# Patient Record
Sex: MALE | Race: WHITE | NOT HISPANIC OR LATINO | Employment: FULL TIME | URBAN - METROPOLITAN AREA
[De-identification: names, ages, dates, MRNs, and addresses within clinical notes are randomized per-mention and may not be internally consistent; named-entity substitution may affect disease eponyms.]

---

## 2017-01-27 ENCOUNTER — APPOINTMENT (OUTPATIENT)
Dept: GENERAL RADIOLOGY | Facility: CLINIC | Age: 32
End: 2017-01-27
Attending: PHYSICIAN ASSISTANT
Payer: COMMERCIAL

## 2017-01-27 ENCOUNTER — HOSPITAL ENCOUNTER (EMERGENCY)
Facility: CLINIC | Age: 32
Discharge: HOME | End: 2017-01-27
Attending: PHYSICIAN ASSISTANT | Admitting: PHYSICIAN ASSISTANT
Payer: COMMERCIAL

## 2017-01-27 VITALS — DIASTOLIC BLOOD PRESSURE: 101 MMHG | TEMPERATURE: 98.8 F | SYSTOLIC BLOOD PRESSURE: 169 MMHG | OXYGEN SATURATION: 99 %

## 2017-01-27 DIAGNOSIS — M25.462 EFFUSION, LEFT KNEE: ICD-10-CM

## 2017-01-27 PROCEDURE — 73560 X-RAY EXAM OF KNEE 1 OR 2: CPT | Mod: LT

## 2017-01-27 PROCEDURE — 99213 OFFICE O/P EST LOW 20 MIN: CPT

## 2017-01-27 PROCEDURE — 99213 OFFICE O/P EST LOW 20 MIN: CPT | Performed by: PHYSICIAN ASSISTANT

## 2017-01-27 NOTE — ED AVS SNAPSHOT
[facility] Emergency Department    [address] [address]    Phone:  [phone]    Fax:  [phone]                                       Ed Mckenzie   MRN: [id number]    Department:  [facility] Emergency Department   Date of Visit:  1/27/2017           After Visit Summary Signature Page     I have received my discharge instructions, and my questions have been answered. I have discussed any challenges I see with this plan with the nurse or doctor.    ..........................................................................................................................................  Patient/Patient Representative Signature      ..........................................................................................................................................  Patient Representative Print Name and Relationship to Patient    ..................................................               ................................................  Date                                            Time    ..........................................................................................................................................  Reviewed by Signature/Title    ...................................................              ..............................................  Date                                                            Time

## 2017-01-27 NOTE — ED PROVIDER NOTES
History     Chief Complaint   Patient presents with     Knee Pain     was on a cruise and did a lot of walking and now is having a lot of left knee pain and swelling no injury      HPI  Ed Mckenzie is a 31 year old male who presents to  with concerns over left knee pain for the last 3-4 days.  Pain has been gradually worsening since onset.  He denies any history of instigating trauma, however states that he did walk a lot more than usual while on a cruise the last several days.  In addition to pain he notes swelling in the knee.  He denies any ecchymosis, lacerations, abrasions, erythema or rashes.  The knee is not warm to the touch.  He does not have any distal numbness or paresthesias.  He said the pain is exacerbated when he wakes up in the morning and with some flexion.  He has not attempted any over-the-counter treatments aside from ACE wrapping.  He denies any prior history of significant knee pain or trauma.    History reviewed. No pertinent past medical history.  No current outpatient prescriptions on file.     Social History   Substance Use Topics     Smoking status: Current Every Day Smoker     Smokeless tobacco: Not on file     Alcohol Use: Yes     I have reviewed the Medications, Allergies, Past Medical and Surgical History, and Social History in the Epic system.    Review of Systems  CONSTITUTIONAL:NEGATIVE for fever, chills, change in weight  INTEGUMENTARY/SKIN: NEGATIVE for erythema, ecchymosis, lacerations, abrasions or rashes  RESP:NEGATIVE for significant cough or SOB  MUSCULOSKELETAL: POSITIVE  for left knee pain and swelling and NEGATIVE for other significant arthralgias or myalgias  NEURO: NEGATIVE for distal numbness or paresthesias   Physical Exam   /101 mmHg  Temp(Src) 98.8  F (37.1  C) (Oral)  SpO2 99%  Physical Exam   Constitutional: He is oriented to person, place, and time. He appears well-developed and well-nourished. No distress.   Musculoskeletal:        Left knee: He  exhibits decreased range of motion (actively due to pain and swelling), swelling and effusion. He exhibits no ecchymosis, normal alignment, no LCL laxity and no MCL laxity. Tenderness found. Lateral joint line tenderness noted.        Left lower leg: Normal. He exhibits no tenderness and no swelling.   Neurological: He is alert and oriented to person, place, and time. No sensory deficit.   Skin: Skin is warm and dry. No abrasion, no ecchymosis, no laceration and no rash noted. No erythema.     ED Course   Procedures        Critical Care time:  none            Results for orders placed or performed during the hospital encounter of 01/27/17   Knee XR, 2 views, left    Narrative    LEFT KNEE ONE/TWO VIEW(S)   1/27/2017 5:35 PM     HISTORY: Pain, swelling, no history of trauma.    COMPARISON: None.      Impression    IMPRESSION: There is a moderate-sized joint effusion. No evidence for  fracture. Disc spaces are preserved.    WHITNEY RUFFIN MD     Labs Ordered and Resulted from Time of ED Arrival Up to the Time of Departure from the ED - No data to display    Assessments & Plan (with Medical Decision Making)     I have reviewed the nursing notes.    I have reviewed the findings, diagnosis, plan and need for follow up with the patient.  There are no discharge medications for this patient.    Final diagnoses:   Effusion, left knee     31-year-old male presents to urgent care with concerns over left knee pain after he had an increased activity level on a cruise earlier this week.  He is notably hypertensive on arrival, remainder of vital signs within normal limits.  Physical exam findings as described above her most significant for a left knee effusion with decreased range of motion of the knee with flexion and extension due to pain and tenderness in the lateral joint line.  As part of evaluation patient did have x-ray of the knee which was negative for acute fracture, dislocation, significant arthropathy however a  moderate-sized effusion was present.  Differential for his pain includes meniscal tear, osteochondral defect,  lateral collateral ligament tear, patella femoral syndrome.  I do not suspect septic arthritis, gout, other systemic inflammatory arthritis.  He did not have any diffuse lower extremity swelling to suggest DVT.  Patient was discharged him stable with instructions for her symptomatically treatment as needed with the rest, ice and ibuprofen/Tylenol as needed for pain.  He was instructed to follow up with orthopedic improvement within the next 5-7 days.  Worrisome reasons to return to ER/UC sooner discussed including including development of erythema, warmth, fever, lower extremity swelling, distal numbness or paresthesias or any other new or worsening symptoms.  Discussed risk/benefits of prescription  pain medication and patient agreed to defer at this time.    Disclaimer: This note consists of symbols derived from keyboarding, dictation, and/or voice recognition software. As a result, there may be errors in the script that have gone undetected.  Please consider this when interpreting information found in the chart.      1/27/2017   [facility] EMERGENCY DEPARTMENT      Ksenia Zavaleta PA-C  02/02/17 1146

## 2017-01-27 NOTE — ED NOTES
was on a cruise and did a lot of walking and now is having a lot of left knee pain and swelling no injury

## 2017-01-27 NOTE — DISCHARGE INSTRUCTIONS
Water on the Knee    Water on the knee is also known as knee effusion. The knee joint normally has less than 1 ounce of fluid. Injury or inflammation of the knee joint causes extra fluid to collect there. When this happens, the knee joint looks swollen and is usually painful. It may be hard to fully bend the knee.  The most common cause of water on the knee is osteoarthritis due to wear and tear on the joint cartilage. Other causes include injury to the cartilage, inflammatory arthritis such as gout or rheumatoid arthritis, and infection of the joint.  You may need a needle aspiration, if the cause of your water on the knee is not certain. This procedure removes a sample of joint fluid from the knee for testing. This is done with a local anesthetic. Removing excess fluid may also relieve swelling and pain.  Home care    Limit your activities. Stay off the injured leg as much as possible until pain improves.    Keep your leg elevated to reduce pain and swelling. When sleeping, place a pillow under the injured leg. When sitting, support the injured leg so it is level with your waist. This is very important during the first 48 hours.    Apply an ice pack over the injured area for 15 to 20 minutes every 3 to 6 hours. You should do this for the first 24 to 48 hours. You can make an ice pack by filling a plastic bag that seals at the top with ice cubes and then wrapping it with a thin towel. Continue to use ice packs for relief of pain and swelling as needed. As the ice melts, be careful to avoid getting your wrap, splint, or cast wet. After 48 hours, apply heat(warm shower or warm bath) for 15 to 20 minutes several times a day, or alternate ice and heat. If you have to wear a hook-and-loop knee brace, you can open it to apply the ice pack, or heat, directly to the knee. Never put ice directly on the skin. Always wrap the ice in a towel or other type of cloth.    You may use over-the-counter pain medicine to control  pain, unless another pain medicine was prescribed. If you have chronic liver or kidney disease or have ever had a stomach ulcer or GI bleeding, talk with your healthcare provider before using these medicines.    If crutches or a walker have been recommended, do not put weight on the injured leg until you can do so without pain. Check with your healthcare provider before returning to sports or full work duties.    If you have a hook-and-loop knee brace, you can remove it to bathe and sleep, unless told otherwise.  Follow-up care  Follow up with your healthcare provider as advised.  If you are overweight, talk to your healthcare provider about a weight loss program. The excess weight puts extra strain on your knees.  When to seek medical advice  Call your healthcare provider right away if any of these occur:    Increasing pain, redness, or swelling of the knee    Fever of 100.4 F (38 C) or above lasting for 24 to 48 hours    3088-5260 [employer]. [address] [place] [address], [place] [address]. All rights reserved. This information is not intended as a substitute for professional medical care. Always follow your healthcare professional's instructions.

## 2017-01-27 NOTE — ED AVS SNAPSHOT
[facility] Emergency Department    [address] [facility] [address] [address]    Phone:  [phone]    Fax:  [phone]                                       Ed Mckenzie   MRN: [id number]    Department:  [facility] Emergency Department   Date of Visit:  1/27/2017           Patient Information     Date Of Birth          1985        Your diagnoses for this visit were:     Effusion, left knee        You were seen by Ksenia Zavaleta PA-C.      Follow-up Information     Follow up with [facility] In 1 week.    Why:  if no improvement or sooner if new or worsening symptoms     Contact information:    [facility] [facility] [address] [phone]          Discharge Instructions         Water on the Knee    Water on the knee is also known as knee effusion. The knee joint normally has less than 1 ounce of fluid. Injury or inflammation of the knee joint causes extra fluid to collect there. When this happens, the knee joint looks swollen and is usually painful. It may be hard to fully bend the knee.  The most common cause of water on the knee is osteoarthritis due to wear and tear on the joint cartilage. Other causes include injury to the cartilage, inflammatory arthritis such as gout or rheumatoid arthritis, and infection of the joint.  You may need a needle aspiration, if the cause of your water on the knee is not certain. This procedure removes a sample of joint fluid from the knee for testing. This is done with a local anesthetic. Removing excess fluid may also relieve swelling and pain.  Home care    Limit your activities. Stay off the injured leg as much as possible until pain improves.    Keep your leg elevated to reduce pain and swelling. When sleeping, place a pillow under the injured leg. When sitting, support the injured leg so it is level with your waist. This is very important during the first 48 hours.    Apply an ice pack over the injured area for 15 to 20  minutes every 3 to 6 hours. You should do this for the first 24 to 48 hours. You can make an ice pack by filling a plastic bag that seals at the top with ice cubes and then wrapping it with a thin towel. Continue to use ice packs for relief of pain and swelling as needed. As the ice melts, be careful to avoid getting your wrap, splint, or cast wet. After 48 hours, apply heat(warm shower or warm bath) for 15 to 20 minutes several times a day, or alternate ice and heat. If you have to wear a hook-and-loop knee brace, you can open it to apply the ice pack, or heat, directly to the knee. Never put ice directly on the skin. Always wrap the ice in a towel or other type of cloth.    You may use over-the-counter pain medicine to control pain, unless another pain medicine was prescribed. If you have chronic liver or kidney disease or have ever had a stomach ulcer or GI bleeding, talk with your healthcare provider before using these medicines.    If crutches or a walker have been recommended, do not put weight on the injured leg until you can do so without pain. Check with your healthcare provider before returning to sports or full work duties.    If you have a hook-and-loop knee brace, you can remove it to bathe and sleep, unless told otherwise.  Follow-up care  Follow up with your healthcare provider as advised.  If you are overweight, talk to your healthcare provider about a weight loss program. The excess weight puts extra strain on your knees.  When to seek medical advice  Call your healthcare provider right away if any of these occur:    Increasing pain, redness, or swelling of the knee    Fever of 100.4 F (38 C) or above lasting for 24 to 48 hours    3253-1276 [employer]. [address] [place] [address], [place] [address]. All rights reserved. This information is not intended as a substitute for professional medical care. Always follow your healthcare professional's instructions.          24 Hour Appointment  "Hotline       To make an appointment at any [facility], call [phone] ([phone]). If you don't have a family doctor or clinic, we will help you find one. [facility] clinics are conveniently located to serve the needs of you and your family.             Review of your medicines      Notice     You have not been prescribed any medications.            Procedures and tests performed during your visit     Knee XR, 2 views, left      Orders Needing Specimen Collection     None      Pending Results     Date and Time Order Name Status Description    1/27/2017 1713 Knee XR, 2 views, left Preliminary             Pending Culture Results     No orders found from 1/26/2017 to 1/28/2017.       Test Results from your hospital stay           1/27/2017  5:38 PM - Interface, Radiant Ib      Narrative     LEFT KNEE ONE/TWO VIEW(S)   1/27/2017 5:35 PM     HISTORY: Pain, swelling, no history of trauma.    COMPARISON: None.        Impression     IMPRESSION: There is a moderate-sized joint effusion. No evidence for  fracture. Disc spaces are preserved.                Thank you for choosing [facility]       Thank you for choosing [facility] for your care. Our goal is always to provide you with excellent care. Hearing back from our patients is one way we can continue to improve our services. Please take a few minutes to complete the written survey that you may receive in the mail after you visit with us. Thank you!        Moxie Jeanhart Information     [employer] lets you send messages to your doctor, view your test results, renew your prescriptions, schedule appointments and more. To sign up, go to [email or web address] . Click on \"Log in\" on the left side of the screen, which will take you to the Welcome page. Then click on \"Sign up Now\" on the right side of the page.     You will be asked to enter the access code listed below, as well as some personal information. Please follow the directions to create your username and password.   "   Your access code is: [email or web address]  Expires: 2017  5:50 PM     Your access code will  in 90 days. If you need help or a new code, please call your [place] [facility] clinic or [phone].        Care EveryWhere ID     This is your Care EveryWhere ID. This could be used by other organizations to access your [facility] medical records  [id number]        After Visit Summary       This is your record. Keep this with you and show to your community pharmacist(s) and doctor(s) at your next visit.

## 2017-04-20 DIAGNOSIS — M25.569 PAIN IN JOINT, LOWER LEG: Primary | ICD-10-CM

## 2017-04-20 LAB
APPEARANCE FLD: NORMAL
COLOR FLD: NORMAL
LYMPHOCYTES NFR FLD MANUAL: 13 %
MONOS+MACROS NFR FLD MANUAL: 14 %
NEUTS BAND NFR FLD MANUAL: 73 %
RBC # FLD: NORMAL /UL
SPECIMEN SOURCE FLD: NORMAL
WBC # FLD AUTO: NORMAL /UL

## 2017-04-20 PROCEDURE — 89060 EXAM SYNOVIAL FLUID CRYSTALS: CPT | Performed by: ORTHOPAEDIC SURGERY

## 2017-04-20 PROCEDURE — 89051 BODY FLUID CELL COUNT: CPT | Performed by: ORTHOPAEDIC SURGERY

## 2017-04-20 PROCEDURE — 86618 LYME DISEASE ANTIBODY: CPT | Performed by: ORTHOPAEDIC SURGERY

## 2017-04-20 PROCEDURE — 36415 COLL VENOUS BLD VENIPUNCTURE: CPT | Performed by: ORTHOPAEDIC SURGERY

## 2017-04-21 LAB
B BURGDOR IGG+IGM SER QL: 0.34 (ref 0–0.89)
CRYSTALS SNV MICRO: ABNORMAL

## 2017-04-22 DIAGNOSIS — M25.50 PAIN IN JOINT: Primary | ICD-10-CM

## 2017-10-16 ENCOUNTER — OFFICE VISIT (OUTPATIENT)
Dept: FAMILY MEDICINE | Facility: CLINIC | Age: 32
End: 2017-10-16
Payer: COMMERCIAL

## 2017-10-16 VITALS
DIASTOLIC BLOOD PRESSURE: 97 MMHG | BODY MASS INDEX: 25.87 KG/M2 | WEIGHT: 191 LBS | TEMPERATURE: 98.1 F | SYSTOLIC BLOOD PRESSURE: 148 MMHG | HEART RATE: 76 BPM | HEIGHT: 72 IN

## 2017-10-16 DIAGNOSIS — M10.09 ACUTE IDIOPATHIC GOUT OF MULTIPLE SITES: Primary | ICD-10-CM

## 2017-10-16 LAB
ANION GAP SERPL CALCULATED.3IONS-SCNC: 6 MMOL/L (ref 3–14)
BUN SERPL-MCNC: 10 MG/DL (ref 7–30)
CALCIUM SERPL-MCNC: 9.4 MG/DL (ref 8.5–10.1)
CHLORIDE SERPL-SCNC: 98 MMOL/L (ref 94–109)
CO2 SERPL-SCNC: 31 MMOL/L (ref 20–32)
CREAT SERPL-MCNC: 1.05 MG/DL (ref 0.66–1.25)
GFR SERPL CREATININE-BSD FRML MDRD: 81 ML/MIN/1.7M2
GLUCOSE SERPL-MCNC: 95 MG/DL (ref 70–99)
POTASSIUM SERPL-SCNC: 3.5 MMOL/L (ref 3.4–5.3)
SODIUM SERPL-SCNC: 135 MMOL/L (ref 133–144)
URATE SERPL-MCNC: 9.5 MG/DL (ref 3.5–7.2)

## 2017-10-16 PROCEDURE — 80048 BASIC METABOLIC PNL TOTAL CA: CPT | Performed by: NURSE PRACTITIONER

## 2017-10-16 PROCEDURE — 99213 OFFICE O/P EST LOW 20 MIN: CPT | Performed by: NURSE PRACTITIONER

## 2017-10-16 PROCEDURE — 36415 COLL VENOUS BLD VENIPUNCTURE: CPT | Performed by: NURSE PRACTITIONER

## 2017-10-16 PROCEDURE — 84550 ASSAY OF BLOOD/URIC ACID: CPT | Performed by: NURSE PRACTITIONER

## 2017-10-16 RX ORDER — COLCHICINE 0.6 MG/1
CAPSULE ORAL
Status: CANCELLED | OUTPATIENT
Start: 2017-10-16

## 2017-10-16 RX ORDER — OMEGA-3 FATTY ACIDS/FISH OIL 300-1000MG
800 CAPSULE ORAL EVERY 8 HOURS PRN
COMMUNITY
End: 2020-01-09

## 2017-10-16 RX ORDER — COLCHICINE 0.6 MG/1
TABLET ORAL
Qty: 3 TABLET | Refills: 0 | Status: SHIPPED | OUTPATIENT
Start: 2017-10-16 | End: 2018-07-18

## 2017-10-16 RX ORDER — INDOMETHACIN 50 MG/1
CAPSULE ORAL
Refills: 0 | COMMUNITY
Start: 2017-05-04 | End: 2018-07-10

## 2017-10-16 NOTE — PATIENT INSTRUCTIONS
Thank you for choosing [facility] ([facility]  You may be receiving a survey in the mail from Deric Blair regarding your visit today.  Please take a few minutes to complete and return the survey to let us know how we are doing.      If you have questions or concerns, please contact us via [employer] or you can contact your care team at [phone].    Our Clinic hours are:  Monday 6:40 am  to 7:00 pm  Tuesday -Friday 6:40 am to 5:00 pm    The [place] outpatient lab hours are:  Monday - Friday 6:10 am to 4:45 pm  Saturdays 7:00 am to 11:00 am  Appointments are required, call [phone]    If you have clinical questions after hours or would like to schedule an appointment,  call the clinic at [phone].

## 2017-10-16 NOTE — NURSING NOTE
"Chief Complaint   Patient presents with     Musculoskeletal Problem     joint pains-  left knee and right ankle       Initial BP (!) 148/97 (BP Location: Left arm)  Pulse 76  Temp 98.1  F (36.7  C) (Oral)  Ht 5' 11.5\" (1.816 m)  Wt 191 lb (86.6 kg)  BMI 26.27 kg/m2 Estimated body mass index is 26.27 kg/(m^2) as calculated from the following:    Height as of this encounter: 5' 11.5\" (1.816 m).    Weight as of this encounter: 191 lb (86.6 kg).  Medication Reconciliation: complete  "

## 2017-10-16 NOTE — PROGRESS NOTES
"S: Ed is a 32 year old male here today for evaluation of left knee and right ankle pain that started on Tuesday. He states he went to  Orthopedics last spring and had a joint fluid analysis done which showed uric acid crystals in his knee and was told this was gout. States 90cc of fluid was removed at that time.   Now he has a \"flare up\" once every 3 months that is extremely painful and causes him to limit his activities. When pain is at its worse he cannot bend his knee or put any pressure on it. Cannot drive or go up stairs. He has tried making changes to his diet like limiting beer and red meat. Still drinks other alcohol. Had a few drinks last weekend but is not sure if this caused his gout because the pain started a few days later. He takes ibuprofen or indomethacin which helps with the pain. Ice and rest also helps. He is wondering what can be done to prevent this. He has never been on any other medications for this problem.    Review Of Systems  Skin: negative  Eyes: negative  Ears/Nose/Throat: negative  Respiratory: No shortness of breath, dyspnea on exertion, cough, or hemoptysis  Cardiovascular: negative  Gastrointestinal: negative  Genitourinary: negative  Musculoskeletal: see hpi  Neurologic: negative  Psychiatric: negative  Hematologic/Lymphatic/Immunologic: negative  Endocrine: negative    O: BP (!) 148/97 (BP Location: Left arm)  Pulse 76  Temp 98.1  F (36.7  C) (Oral)  Ht 5' 11.5\" (1.816 m)  Wt 191 lb (86.6 kg)  BMI 26.27 kg/m2    Physical Exam   Constitutional: He is well-developed, well-nourished, and in no distress.   HENT:   Head: Normocephalic and atraumatic.   Musculoskeletal:        Left knee: He exhibits swelling. Tenderness found.        Right ankle: He exhibits no swelling and no ecchymosis. Tenderness. Lateral malleolus tenderness found.   No left knee tenderness with palpation. Pt able to bend left knee joint to 90 degrees and flex/extend with no pain. Mild swelling of left " knee. No warmth or erythema.       Neurological: He is alert.   Skin: Skin is warm and dry. No rash noted. No erythema.   Psychiatric: Affect normal.     A/P:  Acute idiopathic gout of multiple sites:   - Check kidney function and uric acid levels   - Colchicine for symptomatic relief   - Will consider allopurinol once lab work is complete    KRIS Wan Student   10/16/17

## 2017-10-16 NOTE — LETTER
October 17, 2017      Ed Mckenzie  [address] [address]        Dear ,    We are writing to inform you of your test results.    Kidney function is normal.   Uric acid level is elevated.     Recommend starting uric acid lowering therapy with allopurinol 100 mg daily.   In addition, you will take colchicine 0.6 mg daily for prophylaxis while initiating the uric acid lowering therapy - will continue for 6 months.   Need to check uric acid levels in one month (around 11/17/17). If still high, we will increase the dose of the allopurinol.     Follow up in one month (around 11/17/17) to recheck symptoms and labs.       If you have any questions or concerns, please call the clinic at the number listed above.       Sincerely,        GENA Upton CNP

## 2017-10-16 NOTE — MR AVS SNAPSHOT
After Visit Summary   10/16/2017    Ed Mckenzie    MRN: [id number]           Patient Information     Date Of Birth          1985        Visit Information        Provider Department      10/16/2017 6:00 PM Carolann Page APRN CNP [facility]        Today's Diagnoses     Acute idiopathic gout of multiple sites    -  1      Care Instructions          Thank you for choosing [facility].  You may be receiving a survey in the mail from [facility] regarding your visit today.  Please take a few minutes to complete and return the survey to let us know how we are doing.      If you have questions or concerns, please contact us via [employer] or you can contact your care team at [phone].    Our Clinic hours are:  Monday 6:40 am  to 7:00 pm  Tuesday -Friday 6:40 am to 5:00 pm    The [place] outpatient lab hours are:  Monday - Friday 6:10 am to 4:45 pm  Saturdays 7:00 am to 11:00 am  Appointments are required, call [phone]    If you have clinical questions after hours or would like to schedule an appointment,  call the clinic at [phone].            Follow-ups after your visit        Who to contact     If you have questions or need follow up information about today's clinic visit or your schedule please contact [facility] directly at [phone].  Normal or non-critical lab and imaging results will be communicated to you by MyChart, letter or phone within 4 business days after the clinic has received the results. If you do not hear from us within 7 days, please contact the clinic through [employer] or phone. If you have a critical or abnormal lab result, we will notify you by phone as soon as possible.  Submit refill requests through [employer] or call your pharmacy and they will forward the refill request to us. Please allow 3 business days for your refill to be completed.          Additional Information About Your Visit        MyChart Information      "[employer] lets you send messages to your doctor, view your test results, renew your prescriptions, schedule appointments and more. To sign up, go to [email or web address] . Click on \"Log in\" on the left side of the screen, which will take you to the Welcome page. Then click on \"Sign up Now\" on the right side of the page.     You will be asked to enter the access code listed below, as well as some personal information. Please follow the directions to create your username and password.     Your access code is: [email or web address]  Expires: 1/15/2018  9:21 AM     Your access code will  in 90 days. If you need help or a new code, please call your [place] clinic or [phone].        Care EveryWhere ID     This is your [facility] EveryWhere ID. This could be used by other organizations to access your [place] medical records  [id number]        Your Vitals Were     Pulse Temperature Height BMI (Body Mass Index)          76 98.1  F (36.7  C) (Oral) 5' 11.5\" (1.816 m) 26.27 kg/m2         Blood Pressure from Last 3 Encounters:   10/16/17 (!) 148/97   17 (!) 169/101    Weight from Last 3 Encounters:   10/16/17 191 lb (86.6 kg)              We Performed the Following     Basic metabolic panel     Uric acid          Today's Medication Changes          These changes are accurate as of: 10/16/17 11:59 PM.  If you have any questions, ask your nurse or doctor.               Start taking these medicines.        Dose/Directions    allopurinol 100 MG tablet   Commonly known as:  ZYLOPRIM   Used for:  Acute idiopathic gout of multiple sites   Started by:  Carolann Page APRN CNP        Dose:  100 mg   Take 1 tablet (100 mg) by mouth daily   Quantity:  30 tablet   Refills:  3       * colchicine 0.6 MG tablet   Commonly known as:  COLCRYS   Used for:  Acute idiopathic gout of multiple sites   Started by:  Carolann Page APRN CNP        2 tabs at the onset of flare, then 1 tab in 2 hrs   Quantity:  3 " tablet   Refills:  0       * colchicine 0.6 MG tablet   Commonly known as:  COLCRYS   Used for:  Acute idiopathic gout of multiple sites   Started by:  Carolann Page APRN CNP        Dose:  0.6 mg   Take 1 tablet (0.6 mg) by mouth daily   Quantity:  30 tablet   Refills:  5       * Notice:  This list has 2 medication(s) that are the same as other medications prescribed for you. Read the directions carefully, and ask your doctor or other care provider to review them with you.         Where to get your medicines      These medications were sent to [place] [address] [place] [facility] - [address] [address]     Phone:  [phone]     allopurinol 100 MG tablet    colchicine 0.6 MG tablet    colchicine 0.6 MG tablet                Primary Care Provider Office Phone # Fax #    Chelle Bobalona Rashid [phone] [address] [facility] [address]        Equal Access to Services     PEGGY LEE AH: Hadii andry ku hadasho Soomaali, waaxda luqadaha, qaybta kaalmada adeegyada, waxay idiin haypadminin leslie watson. So [facility] [phone].    ATENCIÓN: Si habla español, tiene a aragon disposición servicios gratuitos de asistencia lingüística. Llame al [phone].    We comply with applicable federal civil rights laws and [place] laws. We do not discriminate on the basis of race, color, national origin, age, disability, sex, sexual orientation, or gender identity.            Thank you!     Thank you for choosing [facility]  for your care. Our goal is always to provide you with excellent care. Hearing back from our patients is one way we can continue to improve our services. Please take a few minutes to complete the written survey that you may receive in the mail after your visit with us. Thank you!             Your Updated Medication List - Protect others around you: Learn how to safely use, store and throw away your medicines at  www.disposemymeds.org.          This list is accurate as of: 10/16/17 11:59 PM.  Always use your most recent med list.                   Brand Name Dispense Instructions for use Diagnosis    allopurinol 100 MG tablet    ZYLOPRIM    30 tablet    Take 1 tablet (100 mg) by mouth daily    Acute idiopathic gout of multiple sites       * colchicine 0.6 MG tablet    COLCRYS    3 tablet    2 tabs at the onset of flare, then 1 tab in 2 hrs    Acute idiopathic gout of multiple sites       * colchicine 0.6 MG tablet    COLCRYS    30 tablet    Take 1 tablet (0.6 mg) by mouth daily    Acute idiopathic gout of multiple sites       ibuprofen 200 MG capsule      Take 200 mg by mouth every 4 hours as needed for fever        indomethacin 50 MG capsule    INDOCIN     TAKE 1 CAPSULE 3 TIMES DAILY WITH FOOD.        MULTIVITAMIN ADULT PO           PRILOSEC OTC PO           * Notice:  This list has 2 medication(s) that are the same as other medications prescribed for you. Read the directions carefully, and ask your doctor or other care provider to review them with you.

## 2017-10-16 NOTE — PROGRESS NOTES
"  SUBJECTIVE:   Ed Mckenzie is a 32 year old male who presents to clinic today for the following health issues:      Joint pains- ankle and left knee      Duration: a few years- off and on    Had knee aspirated last winter - confirmed gout diagnosis.    Has had 4-5 attacks since then.    Description (location/character/radiation): patient here today c/o gout flares- ankles and left knee    Now , left knee is swollen, red    Asking for preventive measures    Intensity:  Moderate to severe. This attack has improved with indomethacin.    Accompanying signs and symptoms: swelling, painful, unable to bend knee    History (similar episodes/previous evaluation): yes    Precipitating or alleviating factors: None    Therapies tried and outcome: - saw someone in ortho who did joint fluid analysis- crystals seen.   watching what he eats- trying to narrow that down, but doesn't seem to help;  Elevation and ice    Apple cider vinegar    Pure cherry juice    biofreeze    Indomethacin - 5 day course recently    Has tried to adjust diet - doesn't seem to matter.         Problem list and histories reviewed & adjusted, as indicated.  Additional history: as documented      Reviewed and updated as needed this visit by clinical staff  Tobacco  Allergies  Meds  Med Hx  Surg Hx  Fam Hx  Soc Hx      Reviewed and updated as needed this visit by Provider         ROS:  Constitutional, HEENT, cardiovascular, pulmonary, gi and gu systems are negative, except as otherwise noted.      OBJECTIVE:   BP (!) 148/97 (BP Location: Left arm)  Pulse 76  Temp 98.1  F (36.7  C) (Oral)  Ht 5' 11.5\" (1.816 m)  Wt 191 lb (86.6 kg)  BMI 26.27 kg/m2  Body mass index is 26.27 kg/(m^2).  GENERAL: healthy, alert and no distress  RESP: lungs clear to auscultation - no rales, rhonchi or wheezes  CV: regular rate and rhythm, normal S1 S2, no S3 or S4, no murmur, click or rub, no peripheral edema and peripheral pulses strong  MS: Left knee - minimal " effusion, otherwise normal.      Diagnostic Test Results:  Results for orders placed or performed in visit on 10/16/17 (from the past 24 hour(s))   Uric acid   Result Value Ref Range    Uric Acid 9.5 (H) 3.5 - 7.2 mg/dL   Basic metabolic panel   Result Value Ref Range    Sodium 135 133 - 144 mmol/L    Potassium 3.5 3.4 - 5.3 mmol/L    Chloride 98 94 - 109 mmol/L    Carbon Dioxide 31 20 - 32 mmol/L    Anion Gap 6 3 - 14 mmol/L    Glucose 95 70 - 99 mg/dL    Urea Nitrogen 10 7 - 30 mg/dL    Creatinine 1.05 0.66 - 1.25 mg/dL    GFR Estimate 81 >60 mL/min/1.7m2    GFR Estimate If Black >90 >60 mL/min/1.7m2    Calcium 9.4 8.5 - 10.1 mg/dL       ASSESSMENT/PLAN:       ICD-10-CM    1. Acute idiopathic gout of multiple sites M10.09 Uric acid     Basic metabolic panel     colchicine (COLCRYS) 0.6 MG tablet     allopurinol (ZYLOPRIM) 100 MG tablet     colchicine (COLCRYS) 0.6 MG tablet     Will treat acute gout with colchicine 1.2 mg now followed by 0.6 mg in 2 hours.  Will then start uric acid lowering therapy with allopurinol 100 mg daily.  Add colchicine 0.6 mg daily for prophylaxis while initiating the uric acid lowering therapy - will continue for 6 months.  Need to check uric acid levels in one month.    Follow up in one month to recheck symptoms and labs.    The risks, benefits and treatment options of prescribed medications or other treatments have been discussed with the patient. The patient verbalized their understanding and should call or follow up if no improvement or if they develop further problems.    GENA Upton [facility]

## 2017-10-17 ENCOUNTER — TELEPHONE (OUTPATIENT)
Dept: FAMILY MEDICINE | Facility: CLINIC | Age: 32
End: 2017-10-17

## 2017-10-17 DIAGNOSIS — M10.09 ACUTE IDIOPATHIC GOUT OF MULTIPLE SITES: ICD-10-CM

## 2017-10-17 RX ORDER — COLCHICINE 0.6 MG/1
0.6 TABLET ORAL DAILY
Qty: 30 TABLET | Refills: 5 | Status: SHIPPED | OUTPATIENT
Start: 2017-10-17 | End: 2017-10-18

## 2017-10-17 RX ORDER — ALLOPURINOL 100 MG/1
100 TABLET ORAL DAILY
Qty: 30 TABLET | Refills: 3 | Status: SHIPPED | OUTPATIENT
Start: 2017-10-17 | End: 2018-02-13

## 2017-10-18 RX ORDER — COLCHICINE 0.6 MG/1
0.6 TABLET, FILM COATED ORAL DAILY
Qty: 30 TABLET | Refills: 5 | Status: SHIPPED | OUTPATIENT
Start: 2017-10-18 | End: 2018-07-10

## 2017-10-18 NOTE — TELEPHONE ENCOUNTER
Patient's insurance will not cover generic formulation of Colchicine.  They will cover brand name, Colcrys.  Please resubmit prescription with RAVINDER.  Thank you.

## 2018-03-21 DIAGNOSIS — M10.09 ACUTE IDIOPATHIC GOUT OF MULTIPLE SITES: ICD-10-CM

## 2018-03-21 RX ORDER — ALLOPURINOL 100 MG/1
TABLET ORAL
Qty: 30 TABLET | Refills: 0 | OUTPATIENT
Start: 2018-03-21

## 2018-03-21 NOTE — TELEPHONE ENCOUNTER
"Requested Prescriptions   Pending Prescriptions Disp Refills     allopurinol (ZYLOPRIM) 100 MG tablet [Pharmacy Med Name: ALLOPURINOL 100 MG TABLET]  Last Written Prescription Date:  02/14/2018  Last Fill Quantity: 30,  # refills: 0   Last office visit: 10/16/2017 with prescribing provider:  Camilo   Future Office Visit:     30 tablet 0     Sig: TAKE 1 TABLET (100 MG) BY MOUTH DAILY    Gout Agents Protocol Failed    3/21/2018 12:57 AM       Failed - CBC on file in past 12 months    No lab results found.         Failed - ALT on file in past 12 months    No lab results found.         Passed - Uric acid greater than or equal to 6 on file in past 12 months    Recent Labs   Lab Test  10/16/17   1831   URIC  9.5*     If level is 6mg/dL or greater, ok to refill one time and refer to provider.          Passed - Recent (12 mo) or future (30 days) visit within the authorizing provider's specialty    Patient had office visit in the last 12 months or has a visit in the next 30 days with authorizing provider or within the authorizing provider's specialty.  See \"Patient Info\" tab in inbasket, or \"Choose Columns\" in Meds & Orders section of the refill encounter.           Passed - Patient is age 18 or older       Passed - Normal serum creatinine on file in the past 12 months    Recent Labs   Lab Test  10/16/17   1831   CR  1.05             Naeem LAFLEUR (R)    "

## 2018-03-21 NOTE — TELEPHONE ENCOUNTER
Routing refill request to provider for review/approval because:  Yulisa given x1 and patient did not follow up, please advise- see notes below.   Kaitlin Fernandez RNC

## 2018-07-10 ENCOUNTER — HOSPITAL ENCOUNTER (INPATIENT)
Facility: CLINIC | Age: 33
LOS: 2 days | Discharge: HOME | DRG: 440 | End: 2018-07-12
Attending: EMERGENCY MEDICINE | Admitting: FAMILY MEDICINE
Payer: COMMERCIAL

## 2018-07-10 ENCOUNTER — APPOINTMENT (OUTPATIENT)
Dept: ULTRASOUND IMAGING | Facility: CLINIC | Age: 33
DRG: 440 | End: 2018-07-10
Attending: EMERGENCY MEDICINE
Payer: COMMERCIAL

## 2018-07-10 ENCOUNTER — APPOINTMENT (OUTPATIENT)
Dept: GENERAL RADIOLOGY | Facility: CLINIC | Age: 33
DRG: 440 | End: 2018-07-10
Attending: EMERGENCY MEDICINE
Payer: COMMERCIAL

## 2018-07-10 ENCOUNTER — APPOINTMENT (OUTPATIENT)
Dept: CT IMAGING | Facility: CLINIC | Age: 33
DRG: 440 | End: 2018-07-10
Attending: EMERGENCY MEDICINE
Payer: COMMERCIAL

## 2018-07-10 DIAGNOSIS — I10 ESSENTIAL HYPERTENSION: ICD-10-CM

## 2018-07-10 DIAGNOSIS — R10.13 ABDOMINAL PAIN, EPIGASTRIC: ICD-10-CM

## 2018-07-10 DIAGNOSIS — R03.0 ELEVATED BLOOD PRESSURE READING WITHOUT DIAGNOSIS OF HYPERTENSION: ICD-10-CM

## 2018-07-10 DIAGNOSIS — K85.90 ACUTE PANCREATITIS WITHOUT INFECTION OR NECROSIS, UNSPECIFIED PANCREATITIS TYPE: ICD-10-CM

## 2018-07-10 LAB
ALBUMIN SERPL-MCNC: 3.9 G/DL (ref 3.4–5)
ALP SERPL-CCNC: 200 U/L (ref 40–150)
ALT SERPL W P-5'-P-CCNC: 102 U/L (ref 0–70)
ANION GAP SERPL CALCULATED.3IONS-SCNC: 9 MMOL/L (ref 3–14)
AST SERPL W P-5'-P-CCNC: 92 U/L (ref 0–45)
BASOPHILS # BLD AUTO: 0.1 10E9/L (ref 0–0.2)
BASOPHILS NFR BLD AUTO: 0.5 %
BILIRUB SERPL-MCNC: 1.2 MG/DL (ref 0.2–1.3)
BUN SERPL-MCNC: 10 MG/DL (ref 7–30)
CALCIUM SERPL-MCNC: 9.1 MG/DL (ref 8.5–10.1)
CHLORIDE SERPL-SCNC: 99 MMOL/L (ref 94–109)
CHOLEST SERPL-MCNC: 217 MG/DL
CO2 SERPL-SCNC: 29 MMOL/L (ref 20–32)
CREAT SERPL-MCNC: 1.09 MG/DL (ref 0.66–1.25)
DIFFERENTIAL METHOD BLD: ABNORMAL
EOSINOPHIL # BLD AUTO: 0.1 10E9/L (ref 0–0.7)
EOSINOPHIL NFR BLD AUTO: 0.8 %
ERYTHROCYTE [DISTWIDTH] IN BLOOD BY AUTOMATED COUNT: 12.2 % (ref 10–15)
ETHANOL SERPL-MCNC: <0.01 G/DL
GFR SERPL CREATININE-BSD FRML MDRD: 77 ML/MIN/1.7M2
GLUCOSE SERPL-MCNC: 159 MG/DL (ref 70–99)
HCT VFR BLD AUTO: 47.1 % (ref 40–53)
HDLC SERPL-MCNC: 63 MG/DL
HGB BLD-MCNC: 16.5 G/DL (ref 13.3–17.7)
IMM GRANULOCYTES # BLD: 0.1 10E9/L (ref 0–0.4)
IMM GRANULOCYTES NFR BLD: 0.4 %
LDLC SERPL CALC-MCNC: 115 MG/DL
LIPASE SERPL-CCNC: 2358 U/L (ref 73–393)
LYMPHOCYTES # BLD AUTO: 1.3 10E9/L (ref 0.8–5.3)
LYMPHOCYTES NFR BLD AUTO: 9.7 %
MCH RBC QN AUTO: 31.6 PG (ref 26.5–33)
MCHC RBC AUTO-ENTMCNC: 35 G/DL (ref 31.5–36.5)
MCV RBC AUTO: 90 FL (ref 78–100)
MONOCYTES # BLD AUTO: 0.7 10E9/L (ref 0–1.3)
MONOCYTES NFR BLD AUTO: 5.5 %
NEUTROPHILS # BLD AUTO: 10.8 10E9/L (ref 1.6–8.3)
NEUTROPHILS NFR BLD AUTO: 83.1 %
NONHDLC SERPL-MCNC: 154 MG/DL
NRBC # BLD AUTO: 0 10*3/UL
NRBC BLD AUTO-RTO: 0 /100
PLATELET # BLD AUTO: 251 10E9/L (ref 150–450)
POTASSIUM SERPL-SCNC: 3.3 MMOL/L (ref 3.4–5.3)
PROT SERPL-MCNC: 7.9 G/DL (ref 6.8–8.8)
RBC # BLD AUTO: 5.22 10E12/L (ref 4.4–5.9)
SODIUM SERPL-SCNC: 137 MMOL/L (ref 133–144)
TRIGL SERPL-MCNC: 193 MG/DL
TROPONIN I SERPL-MCNC: <0.015 UG/L (ref 0–0.04)
WBC # BLD AUTO: 13 10E9/L (ref 4–11)

## 2018-07-10 PROCEDURE — G0378 HOSPITAL OBSERVATION PER HR: HCPCS

## 2018-07-10 PROCEDURE — 25000132 ZZH RX MED GY IP 250 OP 250 PS 637: Performed by: EMERGENCY MEDICINE

## 2018-07-10 PROCEDURE — 74177 CT ABD & PELVIS W/CONTRAST: CPT

## 2018-07-10 PROCEDURE — 96375 TX/PRO/DX INJ NEW DRUG ADDON: CPT | Performed by: EMERGENCY MEDICINE

## 2018-07-10 PROCEDURE — 85025 COMPLETE CBC W/AUTO DIFF WBC: CPT | Performed by: EMERGENCY MEDICINE

## 2018-07-10 PROCEDURE — 12000007 ZZH R&B INTERMEDIATE

## 2018-07-10 PROCEDURE — 25000128 H RX IP 250 OP 636: Performed by: EMERGENCY MEDICINE

## 2018-07-10 PROCEDURE — 25000125 ZZHC RX 250: Performed by: EMERGENCY MEDICINE

## 2018-07-10 PROCEDURE — 25000128 H RX IP 250 OP 636

## 2018-07-10 PROCEDURE — 99223 1ST HOSP IP/OBS HIGH 75: CPT | Mod: AI | Performed by: PHYSICIAN ASSISTANT

## 2018-07-10 PROCEDURE — 93005 ELECTROCARDIOGRAM TRACING: CPT | Performed by: EMERGENCY MEDICINE

## 2018-07-10 PROCEDURE — 84484 ASSAY OF TROPONIN QUANT: CPT | Performed by: EMERGENCY MEDICINE

## 2018-07-10 PROCEDURE — 96376 TX/PRO/DX INJ SAME DRUG ADON: CPT | Performed by: EMERGENCY MEDICINE

## 2018-07-10 PROCEDURE — 80061 LIPID PANEL: CPT | Performed by: PHYSICIAN ASSISTANT

## 2018-07-10 PROCEDURE — 93010 ELECTROCARDIOGRAM REPORT: CPT | Mod: Z6 | Performed by: EMERGENCY MEDICINE

## 2018-07-10 PROCEDURE — 99285 EMERGENCY DEPT VISIT HI MDM: CPT | Mod: 25 | Performed by: EMERGENCY MEDICINE

## 2018-07-10 PROCEDURE — 96374 THER/PROPH/DIAG INJ IV PUSH: CPT | Mod: 59 | Performed by: EMERGENCY MEDICINE

## 2018-07-10 PROCEDURE — 76705 ECHO EXAM OF ABDOMEN: CPT

## 2018-07-10 PROCEDURE — 83690 ASSAY OF LIPASE: CPT | Performed by: EMERGENCY MEDICINE

## 2018-07-10 PROCEDURE — 80320 DRUG SCREEN QUANTALCOHOLS: CPT | Performed by: EMERGENCY MEDICINE

## 2018-07-10 PROCEDURE — 96361 HYDRATE IV INFUSION ADD-ON: CPT | Performed by: EMERGENCY MEDICINE

## 2018-07-10 PROCEDURE — 71046 X-RAY EXAM CHEST 2 VIEWS: CPT

## 2018-07-10 PROCEDURE — 80053 COMPREHEN METABOLIC PANEL: CPT | Performed by: EMERGENCY MEDICINE

## 2018-07-10 RX ORDER — SODIUM CHLORIDE, SODIUM LACTATE, POTASSIUM CHLORIDE, CALCIUM CHLORIDE 600; 310; 30; 20 MG/100ML; MG/100ML; MG/100ML; MG/100ML
INJECTION, SOLUTION INTRAVENOUS CONTINUOUS
Status: DISCONTINUED | OUTPATIENT
Start: 2018-07-10 | End: 2018-07-12

## 2018-07-10 RX ORDER — KETOROLAC TROMETHAMINE 30 MG/ML
30 INJECTION, SOLUTION INTRAMUSCULAR; INTRAVENOUS ONCE
Status: COMPLETED | OUTPATIENT
Start: 2018-07-10 | End: 2018-07-10

## 2018-07-10 RX ORDER — HYDROMORPHONE HYDROCHLORIDE 1 MG/ML
0.5 INJECTION, SOLUTION INTRAMUSCULAR; INTRAVENOUS; SUBCUTANEOUS
Status: DISCONTINUED | OUTPATIENT
Start: 2018-07-10 | End: 2018-07-10

## 2018-07-10 RX ORDER — ONDANSETRON 2 MG/ML
INJECTION INTRAMUSCULAR; INTRAVENOUS
Status: COMPLETED
Start: 2018-07-10 | End: 2018-07-10

## 2018-07-10 RX ORDER — ONDANSETRON 2 MG/ML
4 INJECTION INTRAMUSCULAR; INTRAVENOUS EVERY 6 HOURS PRN
Status: DISCONTINUED | OUTPATIENT
Start: 2018-07-10 | End: 2018-07-12 | Stop reason: HOSPADM

## 2018-07-10 RX ORDER — SODIUM CHLORIDE 9 MG/ML
INJECTION, SOLUTION INTRAVENOUS CONTINUOUS
Status: DISCONTINUED | OUTPATIENT
Start: 2018-07-10 | End: 2018-07-10

## 2018-07-10 RX ORDER — ASPIRIN 81 MG/1
324 TABLET, CHEWABLE ORAL ONCE
Status: COMPLETED | OUTPATIENT
Start: 2018-07-10 | End: 2018-07-10

## 2018-07-10 RX ORDER — LABETALOL HYDROCHLORIDE 5 MG/ML
10 INJECTION, SOLUTION INTRAVENOUS ONCE
Status: DISCONTINUED | OUTPATIENT
Start: 2018-07-10 | End: 2018-07-10

## 2018-07-10 RX ORDER — LORAZEPAM 1 MG/1
1-2 TABLET ORAL EVERY 30 MIN PRN
Status: DISCONTINUED | OUTPATIENT
Start: 2018-07-10 | End: 2018-07-12 | Stop reason: HOSPADM

## 2018-07-10 RX ORDER — POTASSIUM CHLORIDE 1500 MG/1
20-40 TABLET, EXTENDED RELEASE ORAL
Status: DISCONTINUED | OUTPATIENT
Start: 2018-07-10 | End: 2018-07-12 | Stop reason: HOSPADM

## 2018-07-10 RX ORDER — POTASSIUM CHLORIDE 1.5 G/1.58G
20-40 POWDER, FOR SOLUTION ORAL
Status: DISCONTINUED | OUTPATIENT
Start: 2018-07-10 | End: 2018-07-12 | Stop reason: HOSPADM

## 2018-07-10 RX ORDER — CLONIDINE HYDROCHLORIDE 0.1 MG/1
0.1 TABLET ORAL ONCE
Status: COMPLETED | OUTPATIENT
Start: 2018-07-10 | End: 2018-07-10

## 2018-07-10 RX ORDER — PROCHLORPERAZINE MALEATE 10 MG
10 TABLET ORAL EVERY 6 HOURS PRN
Status: DISCONTINUED | OUTPATIENT
Start: 2018-07-10 | End: 2018-07-12 | Stop reason: HOSPADM

## 2018-07-10 RX ORDER — OXYCODONE HYDROCHLORIDE 5 MG/1
5-10 TABLET ORAL
Status: DISCONTINUED | OUTPATIENT
Start: 2018-07-10 | End: 2018-07-12 | Stop reason: HOSPADM

## 2018-07-10 RX ORDER — IOPAMIDOL 755 MG/ML
97 INJECTION, SOLUTION INTRAVASCULAR ONCE
Status: COMPLETED | OUTPATIENT
Start: 2018-07-10 | End: 2018-07-10

## 2018-07-10 RX ORDER — ONDANSETRON 4 MG/1
4 TABLET, ORALLY DISINTEGRATING ORAL EVERY 6 HOURS PRN
Status: DISCONTINUED | OUTPATIENT
Start: 2018-07-10 | End: 2018-07-12

## 2018-07-10 RX ORDER — POTASSIUM CL/LIDO/0.9 % NACL 10MEQ/0.1L
10 INTRAVENOUS SOLUTION, PIGGYBACK (ML) INTRAVENOUS
Status: DISCONTINUED | OUTPATIENT
Start: 2018-07-10 | End: 2018-07-12 | Stop reason: HOSPADM

## 2018-07-10 RX ORDER — HYDROMORPHONE HYDROCHLORIDE 1 MG/ML
0.5 INJECTION, SOLUTION INTRAMUSCULAR; INTRAVENOUS; SUBCUTANEOUS ONCE
Status: COMPLETED | OUTPATIENT
Start: 2018-07-10 | End: 2018-07-10

## 2018-07-10 RX ORDER — HYDROMORPHONE HYDROCHLORIDE 1 MG/ML
.3-.5 INJECTION, SOLUTION INTRAMUSCULAR; INTRAVENOUS; SUBCUTANEOUS
Status: DISCONTINUED | OUTPATIENT
Start: 2018-07-10 | End: 2018-07-12 | Stop reason: HOSPADM

## 2018-07-10 RX ORDER — LABETALOL HYDROCHLORIDE 5 MG/ML
10 INJECTION, SOLUTION INTRAVENOUS
Status: DISCONTINUED | OUTPATIENT
Start: 2018-07-10 | End: 2018-07-12 | Stop reason: HOSPADM

## 2018-07-10 RX ORDER — LORAZEPAM 2 MG/ML
1-2 INJECTION INTRAMUSCULAR EVERY 30 MIN PRN
Status: DISCONTINUED | OUTPATIENT
Start: 2018-07-10 | End: 2018-07-12 | Stop reason: HOSPADM

## 2018-07-10 RX ORDER — ONDANSETRON 4 MG/1
4 TABLET, ORALLY DISINTEGRATING ORAL EVERY 6 HOURS PRN
Status: DISCONTINUED | OUTPATIENT
Start: 2018-07-10 | End: 2018-07-12 | Stop reason: HOSPADM

## 2018-07-10 RX ORDER — ONDANSETRON 2 MG/ML
4 INJECTION INTRAMUSCULAR; INTRAVENOUS EVERY 6 HOURS PRN
Status: DISCONTINUED | OUTPATIENT
Start: 2018-07-10 | End: 2018-07-12

## 2018-07-10 RX ORDER — NALOXONE HYDROCHLORIDE 0.4 MG/ML
.1-.4 INJECTION, SOLUTION INTRAMUSCULAR; INTRAVENOUS; SUBCUTANEOUS
Status: DISCONTINUED | OUTPATIENT
Start: 2018-07-10 | End: 2018-07-12 | Stop reason: HOSPADM

## 2018-07-10 RX ORDER — PROCHLORPERAZINE 25 MG
25 SUPPOSITORY, RECTAL RECTAL EVERY 12 HOURS PRN
Status: DISCONTINUED | OUTPATIENT
Start: 2018-07-10 | End: 2018-07-12 | Stop reason: HOSPADM

## 2018-07-10 RX ORDER — POTASSIUM CHLORIDE 7.45 MG/ML
10 INJECTION INTRAVENOUS
Status: DISCONTINUED | OUTPATIENT
Start: 2018-07-10 | End: 2018-07-12 | Stop reason: HOSPADM

## 2018-07-10 RX ORDER — ONDANSETRON 2 MG/ML
4 INJECTION INTRAMUSCULAR; INTRAVENOUS ONCE
Status: COMPLETED | OUTPATIENT
Start: 2018-07-10 | End: 2018-07-10

## 2018-07-10 RX ADMIN — IOPAMIDOL 97 ML: 755 INJECTION, SOLUTION INTRAVENOUS at 18:01

## 2018-07-10 RX ADMIN — LIDOCAINE HYDROCHLORIDE 30 ML: 20 SOLUTION ORAL; TOPICAL at 15:49

## 2018-07-10 RX ADMIN — SODIUM CHLORIDE 500 ML: 9 INJECTION, SOLUTION INTRAVENOUS at 15:49

## 2018-07-10 RX ADMIN — HYDROMORPHONE HYDROCHLORIDE 0.5 MG: 1 INJECTION, SOLUTION INTRAMUSCULAR; INTRAVENOUS; SUBCUTANEOUS at 20:38

## 2018-07-10 RX ADMIN — SODIUM CHLORIDE: 9 INJECTION, SOLUTION INTRAVENOUS at 21:30

## 2018-07-10 RX ADMIN — SODIUM CHLORIDE 64 ML: 9 INJECTION, SOLUTION INTRAVENOUS at 18:01

## 2018-07-10 RX ADMIN — ONDANSETRON 4 MG: 2 INJECTION INTRAMUSCULAR; INTRAVENOUS at 16:40

## 2018-07-10 RX ADMIN — ASPIRIN 81 MG 324 MG: 81 TABLET ORAL at 15:49

## 2018-07-10 RX ADMIN — SODIUM CHLORIDE 1000 ML: 9 INJECTION, SOLUTION INTRAVENOUS at 16:57

## 2018-07-10 RX ADMIN — KETOROLAC TROMETHAMINE 30 MG: 30 INJECTION, SOLUTION INTRAMUSCULAR at 16:57

## 2018-07-10 RX ADMIN — HYDROMORPHONE HYDROCHLORIDE 0.5 MG: 1 INJECTION, SOLUTION INTRAMUSCULAR; INTRAVENOUS; SUBCUTANEOUS at 22:57

## 2018-07-10 RX ADMIN — SODIUM CHLORIDE 1000 ML: 9 INJECTION, SOLUTION INTRAVENOUS at 22:20

## 2018-07-10 RX ADMIN — HYDROMORPHONE HYDROCHLORIDE 0.5 MG: 1 INJECTION, SOLUTION INTRAMUSCULAR; INTRAVENOUS; SUBCUTANEOUS at 16:40

## 2018-07-10 RX ADMIN — HYDROMORPHONE HYDROCHLORIDE 0.5 MG: 1 INJECTION, SOLUTION INTRAMUSCULAR; INTRAVENOUS; SUBCUTANEOUS at 18:34

## 2018-07-10 RX ADMIN — CLONIDINE HYDROCHLORIDE 0.1 MG: 0.1 TABLET ORAL at 18:27

## 2018-07-10 ASSESSMENT — ENCOUNTER SYMPTOMS
WEAKNESS: 0
HEADACHES: 0
FLANK PAIN: 0
DIZZINESS: 0
NECK PAIN: 0
VOMITING: 0
CONSTIPATION: 0
CONFUSION: 0
BRUISES/BLEEDS EASILY: 0
NAUSEA: 1
ABDOMINAL PAIN: 1
LIGHT-HEADEDNESS: 1
PALPITATIONS: 0
NUMBNESS: 1
STRIDOR: 0
FEVER: 0
COUGH: 0
DIAPHORESIS: 1
SORE THROAT: 0
BLOOD IN STOOL: 0
BACK PAIN: 0
DIARRHEA: 0
ACTIVITY CHANGE: 1
TREMORS: 0
DYSURIA: 0
CHEST TIGHTNESS: 1
CHILLS: 0
WHEEZING: 0
SPEECH DIFFICULTY: 0
APPETITE CHANGE: 1
TROUBLE SWALLOWING: 0
SHORTNESS OF BREATH: 0

## 2018-07-10 NOTE — ED TRIAGE NOTES
Pt arrives via EMS with complains of epigastric pain. Hs of GERD, was recently out of [facility]. Today at work he had abdominal pain on the right side, moved to center. Pt became diaphoretic, nauseated and pale, no vomiting. Started driving here with his wife when the pain became worse, they called EMS. Pt states the pain is cramping and comes in waves. Received 100 mcg fentanyl by EMS with no relief of pain. Pt also states he has been under a lot of stress recently.

## 2018-07-10 NOTE — IP AVS SNAPSHOT
MRN:[id number]                      After Visit Summary   7/10/2018    Ed Mckenzie    MRN: [id number]           Thank you!     Thank you for choosing [facility] for your care. Our goal is always to provide you with excellent care. Hearing back from our patients is one way we can continue to improve our services. Please take a few minutes to complete the written survey that you may receive in the mail after you visit with us. Thank you!        Patient Information     Date Of Birth          1985        Designated Caregiver       Most Recent Value    Caregiver    Will someone help with your care after discharge? yes    Name of designated caregiver Lashanda    Phone number of caregiver [phone]    Caregiver address [address] [place] [address]      About your hospital stay     You were admitted on:  July 10, 2018 You last received care in the:  [facility]    You were discharged on:  July 12, 2018       Who to Call     For medical emergencies, please call 911.  For non-urgent questions about your medical care, please call your primary care provider or clinic, [phone]          Attending Provider     Provider Specialty    Juan C Cantu MD Emergency Medicine    [facility], Estuardo Bailey MD Family Practice    WillsSakina garcia MD Internal Medicine       Primary Care Provider Office Phone # Fax #    Chelle Rashid [phone]      After Care Instructions     Activity       Your activity upon discharge: activity as tolerated            Diet       Follow this diet upon discharge: Orders Placed This Encounter      Regular Diet Adult                  Follow-up Appointments     Follow-up and recommended labs and tests        Follow up with primary care provider, Chelle Rashid, within 1-2 weeks, for hospital follow- up.                  Your next 10 appointments already scheduled     Jul 18, 2018  8:40 AM CDT   Office Visit with Chelle  "Gretchen Gleaves, NP   [facility]    [address]  [facility] [address] [phone]           Bring a current list of meds and any records pertaining to this visit. For Physicals, please bring immunization records and any forms needing to be filled out. Please arrive 10 minutes early to complete paperwork.              Pending Results     Date and Time Order Name Status Description    2018 0952 CT Chest/Abdomen/Pelvis w Contrast Preliminary             Statement of Approval     Ordered          18 [id number]  I have reviewed and agree with all the recommendations and orders detailed in this document.  EFFECTIVE NOW     Approved and electronically signed by:  Sakina Wills MD             Admission Information     Date & Time Provider Department Dept. Phone    7/10/2018 Sakina Wills MD [facility] Surgical [phone]      Your Vitals Were     Blood Pressure Pulse Temperature Respirations Height Weight    149/93 (BP Location: Left arm) 90 99.2  F (37.3  C) (Oral) 18 1.854 m (6' 1\") 83.9 kg (185 lb)    Pulse Oximetry BMI (Body Mass Index)                97% 24.41 kg/m2          MyChart Information     [employer] lets you send messages to your doctor, view your test results, renew your prescriptions, schedule appointments and more. To sign up, go to [email or web address] . Click on \"Log in\" on the left side of the screen, which will take you to the Welcome page. Then click on \"Sign up Now\" on the right side of the page.     You will be asked to enter the access code listed below, as well as some personal information. Please follow the directions to create your username and password.     Your access code is: [email or web address]  Expires: 10/8/2018  3:40 PM     Your access code will  in 90 days. If you need help or a new code, please call your [place] clinic or [phone].        Care EveryWhere ID     This is your Care EveryWhere ID. This could be used " by other organizations to access your [place] medical records  [id number]        Equal Access to Services     PEGGY LEE : Hadii andry Mack, jayde marti, sallie laramajuan david hagan, yinka leonardojamesmaribel watson. So [facility] [phone].    ATENCIÓN: Si habla español, tiene a aragon disposición servicios gratuitos de asistencia lingüística. Filipe al [phone].    We comply with applicable federal civil rights laws and [place] laws. We do not discriminate on the basis of race, color, national origin, age, disability, sex, sexual orientation, or gender identity.               Review of your medicines      START taking        Dose / Directions    metoprolol succinate 50 MG 24 hr tablet   Commonly known as:  TOPROL-XL   Used for:  Essential hypertension        Dose:  50 mg   Take 1 tablet (50 mg) by mouth daily   Quantity:  90 tablet   Refills:  1         CONTINUE these medicines which have NOT CHANGED        Dose / Directions    allopurinol 100 MG tablet   Commonly known as:  ZYLOPRIM   Used for:  Acute idiopathic gout of multiple sites        TAKE 1 TABLET (100 MG) BY MOUTH DAILY   Quantity:  30 tablet   Refills:  0       colchicine 0.6 MG tablet   Commonly known as:  COLCRYS   Used for:  Acute idiopathic gout of multiple sites        2 tabs at the onset of flare, then 1 tab in 2 hrs   Quantity:  3 tablet   Refills:  0       ibuprofen 200 MG capsule        Dose:  800 mg   Take 800 mg by mouth every 8 hours as needed for fever   Refills:  0       ONE-A-DAY MENS PO        Dose:  2 chew tab   Take 2 chew tab by mouth daily   Refills:  0       PRILOSEC OTC PO        Dose:  1 tablet   Take 1 tablet by mouth daily   Refills:  0            Where to get your medicines      These medications were sent to [place] [address] [place] [address] [address]     Phone:  [phone]     metoprolol succinate 50 MG 24 hr tablet                Protect  others around you: Learn how to safely use, store and throw away your medicines at www.disposemymeds.org.             Medication List: This is a list of all your medications and when to take them. Check marks below indicate your daily home schedule. Keep this list as a reference.      Medications           Morning Afternoon Evening Bedtime As Needed    allopurinol 100 MG tablet   Commonly known as:  ZYLOPRIM   TAKE 1 TABLET (100 MG) BY MOUTH DAILY   Next Dose Due:  7/12                                   colchicine 0.6 MG tablet   Commonly known as:  COLCRYS   2 tabs at the onset of flare, then 1 tab in 2 hrs                                   ibuprofen 200 MG capsule   Take 800 mg by mouth every 8 hours as needed for fever                                   metoprolol succinate 50 MG 24 hr tablet   Commonly known as:  TOPROL-XL   Take 1 tablet (50 mg) by mouth daily   Last time this was given:  25 mg on 7/12/2018  8:39 AM   Next Dose Due:  7/12/2018    New medication you were receiving inpatient. It can be picked up at [facility] in [place]                                   ONE-A-DAY MENS PO   Take 2 chew tab by mouth daily   Next Dose Due:  7/12                                   PRILOSEC OTC PO   Take 1 tablet by mouth daily   Next Dose Due:  7/12

## 2018-07-10 NOTE — ED NOTES
Pt resting in bed with SO at bedside. Pain improving, currently rates pain 4/10. No additional complaints at this time.

## 2018-07-10 NOTE — IP AVS SNAPSHOT
[facility]    [address] [facility] [address]    Phone:  [phone]    Fax:  [phone]                                       After Visit Summary   7/10/2018    Ed Mckenzie    MRN: [id number]           After Visit Summary Signature Page     I have received my discharge instructions, and my questions have been answered. I have discussed any challenges I see with this plan with the nurse or doctor.    ..........................................................................................................................................  Patient/Patient Representative Signature      ..........................................................................................................................................  Patient Representative Print Name and Relationship to Patient    ..................................................               ................................................  Date                                            Time    ..........................................................................................................................................  Reviewed by Signature/Title    ...................................................              ..............................................  Date                                                            Time

## 2018-07-11 LAB
ALBUMIN SERPL-MCNC: 2.9 G/DL (ref 3.4–5)
ALBUMIN UR-MCNC: NEGATIVE MG/DL
ALP SERPL-CCNC: 141 U/L (ref 40–150)
ALT SERPL W P-5'-P-CCNC: 63 U/L (ref 0–70)
ANION GAP SERPL CALCULATED.3IONS-SCNC: 7 MMOL/L (ref 3–14)
APPEARANCE UR: CLEAR
AST SERPL W P-5'-P-CCNC: 46 U/L (ref 0–45)
BILIRUB SERPL-MCNC: 1.2 MG/DL (ref 0.2–1.3)
BILIRUB UR QL STRIP: NEGATIVE
BUN SERPL-MCNC: 7 MG/DL (ref 7–30)
CALCIUM SERPL-MCNC: 7.6 MG/DL (ref 8.5–10.1)
CHLORIDE SERPL-SCNC: 104 MMOL/L (ref 94–109)
CO2 SERPL-SCNC: 28 MMOL/L (ref 20–32)
COLOR UR AUTO: YELLOW
CREAT SERPL-MCNC: 1.03 MG/DL (ref 0.66–1.25)
ERYTHROCYTE [DISTWIDTH] IN BLOOD BY AUTOMATED COUNT: 12.4 % (ref 10–15)
GFR SERPL CREATININE-BSD FRML MDRD: 83 ML/MIN/1.7M2
GLUCOSE SERPL-MCNC: 123 MG/DL (ref 70–99)
GLUCOSE UR STRIP-MCNC: NEGATIVE MG/DL
HCT VFR BLD AUTO: 42.6 % (ref 40–53)
HGB BLD-MCNC: 14.5 G/DL (ref 13.3–17.7)
HGB UR QL STRIP: NEGATIVE
KETONES UR STRIP-MCNC: 5 MG/DL
LEUKOCYTE ESTERASE UR QL STRIP: NEGATIVE
LIPASE SERPL-CCNC: 3659 U/L (ref 73–393)
MCH RBC QN AUTO: 31.3 PG (ref 26.5–33)
MCHC RBC AUTO-ENTMCNC: 34 G/DL (ref 31.5–36.5)
MCV RBC AUTO: 92 FL (ref 78–100)
MUCOUS THREADS #/AREA URNS LPF: PRESENT /LPF
NITRATE UR QL: NEGATIVE
PH UR STRIP: 5 PH (ref 5–7)
PLATELET # BLD AUTO: 177 10E9/L (ref 150–450)
POTASSIUM SERPL-SCNC: 4.1 MMOL/L (ref 3.4–5.3)
PROT SERPL-MCNC: 5.9 G/DL (ref 6.8–8.8)
RBC # BLD AUTO: 4.63 10E12/L (ref 4.4–5.9)
RBC #/AREA URNS AUTO: 1 /HPF (ref 0–2)
SODIUM SERPL-SCNC: 139 MMOL/L (ref 133–144)
SOURCE: ABNORMAL
SP GR UR STRIP: >1.035 (ref 1–1.03)
UROBILINOGEN UR STRIP-MCNC: 0 MG/DL (ref 0–2)
WBC # BLD AUTO: 7 10E9/L (ref 4–11)
WBC #/AREA URNS AUTO: 1 /HPF (ref 0–5)

## 2018-07-11 PROCEDURE — 36415 COLL VENOUS BLD VENIPUNCTURE: CPT | Performed by: PHYSICIAN ASSISTANT

## 2018-07-11 PROCEDURE — 80053 COMPREHEN METABOLIC PANEL: CPT | Performed by: PHYSICIAN ASSISTANT

## 2018-07-11 PROCEDURE — 25000125 ZZHC RX 250: Performed by: PHYSICIAN ASSISTANT

## 2018-07-11 PROCEDURE — 81001 URINALYSIS AUTO W/SCOPE: CPT | Performed by: PHYSICIAN ASSISTANT

## 2018-07-11 PROCEDURE — 25000128 H RX IP 250 OP 636: Performed by: PHYSICIAN ASSISTANT

## 2018-07-11 PROCEDURE — 12000007 ZZH R&B INTERMEDIATE

## 2018-07-11 PROCEDURE — 85027 COMPLETE CBC AUTOMATED: CPT | Performed by: PHYSICIAN ASSISTANT

## 2018-07-11 PROCEDURE — 99233 SBSQ HOSP IP/OBS HIGH 50: CPT | Performed by: INTERNAL MEDICINE

## 2018-07-11 PROCEDURE — 25000128 H RX IP 250 OP 636: Performed by: EMERGENCY MEDICINE

## 2018-07-11 PROCEDURE — 84132 ASSAY OF SERUM POTASSIUM: CPT | Performed by: PHYSICIAN ASSISTANT

## 2018-07-11 PROCEDURE — 83690 ASSAY OF LIPASE: CPT | Performed by: INTERNAL MEDICINE

## 2018-07-11 PROCEDURE — 25000132 ZZH RX MED GY IP 250 OP 250 PS 637: Performed by: INTERNAL MEDICINE

## 2018-07-11 PROCEDURE — 25000132 ZZH RX MED GY IP 250 OP 250 PS 637: Performed by: PHYSICIAN ASSISTANT

## 2018-07-11 RX ORDER — METOPROLOL SUCCINATE 25 MG/1
25 TABLET, EXTENDED RELEASE ORAL DAILY
Status: DISCONTINUED | OUTPATIENT
Start: 2018-07-11 | End: 2018-07-12 | Stop reason: HOSPADM

## 2018-07-11 RX ADMIN — POTASSIUM CHLORIDE 20 MEQ: 1500 TABLET, EXTENDED RELEASE ORAL at 01:53

## 2018-07-11 RX ADMIN — HYDROMORPHONE HYDROCHLORIDE 0.5 MG: 1 INJECTION, SOLUTION INTRAMUSCULAR; INTRAVENOUS; SUBCUTANEOUS at 14:44

## 2018-07-11 RX ADMIN — OXYCODONE HYDROCHLORIDE 10 MG: 5 TABLET ORAL at 00:03

## 2018-07-11 RX ADMIN — HYDROMORPHONE HYDROCHLORIDE 0.5 MG: 1 INJECTION, SOLUTION INTRAMUSCULAR; INTRAVENOUS; SUBCUTANEOUS at 20:26

## 2018-07-11 RX ADMIN — HYDROMORPHONE HYDROCHLORIDE 0.5 MG: 1 INJECTION, SOLUTION INTRAMUSCULAR; INTRAVENOUS; SUBCUTANEOUS at 04:31

## 2018-07-11 RX ADMIN — HYDROMORPHONE HYDROCHLORIDE 0.5 MG: 1 INJECTION, SOLUTION INTRAMUSCULAR; INTRAVENOUS; SUBCUTANEOUS at 11:29

## 2018-07-11 RX ADMIN — SODIUM CHLORIDE, POTASSIUM CHLORIDE, SODIUM LACTATE AND CALCIUM CHLORIDE: 600; 310; 30; 20 INJECTION, SOLUTION INTRAVENOUS at 16:19

## 2018-07-11 RX ADMIN — THIAMINE HYDROCHLORIDE: 100 INJECTION, SOLUTION INTRAMUSCULAR; INTRAVENOUS at 22:38

## 2018-07-11 RX ADMIN — HYDROMORPHONE HYDROCHLORIDE 0.5 MG: 1 INJECTION, SOLUTION INTRAMUSCULAR; INTRAVENOUS; SUBCUTANEOUS at 22:38

## 2018-07-11 RX ADMIN — METOPROLOL SUCCINATE 25 MG: 25 TABLET, EXTENDED RELEASE ORAL at 14:44

## 2018-07-11 RX ADMIN — HYDROMORPHONE HYDROCHLORIDE 0.5 MG: 1 INJECTION, SOLUTION INTRAMUSCULAR; INTRAVENOUS; SUBCUTANEOUS at 01:08

## 2018-07-11 RX ADMIN — SODIUM CHLORIDE, POTASSIUM CHLORIDE, SODIUM LACTATE AND CALCIUM CHLORIDE: 600; 310; 30; 20 INJECTION, SOLUTION INTRAVENOUS at 09:55

## 2018-07-11 RX ADMIN — POTASSIUM CHLORIDE 40 MEQ: 1500 TABLET, EXTENDED RELEASE ORAL at 00:00

## 2018-07-11 RX ADMIN — THIAMINE HYDROCHLORIDE: 100 INJECTION, SOLUTION INTRAMUSCULAR; INTRAVENOUS at 00:05

## 2018-07-11 RX ADMIN — HYDROMORPHONE HYDROCHLORIDE 0.5 MG: 1 INJECTION, SOLUTION INTRAMUSCULAR; INTRAVENOUS; SUBCUTANEOUS at 09:03

## 2018-07-11 ASSESSMENT — PAIN DESCRIPTION - DESCRIPTORS: DESCRIPTORS: ACHING

## 2018-07-11 NOTE — PROGRESS NOTES
"[facility] Internal Medicine Progress Note     Date of Service (when I saw the patient): 07/11/2018    REASON FOR ADMISSION / INTERVAL HISTORY:  Abdominal pain. See details below. Pain better.    CT ABD-IMPRESSION:   1. Acute pancreatitis: There is a mild to moderate amount of edema  about the pancreatic head and neck.  2. No evidence of pancreatic necrosis or pseudocyst.     ASSESSMENT/PLAN:   ACUTE ALCOHOLIC PANCREATITIS  Presented with abdominal pain, elevated lipase. CT as above. US negative. Had leucocytosis. gor 2.5 L fluids in ED and was admitted with iv fluids at 150cc/hr. Pain better but lipase slightly worse.   -increase fluids, clears as tolerated, follow labs.    HTN  No prior hx but BP has been as high as 185/110. Could be ETOH/ pain but BP still elevated at 160-177/ 100-110  -start bblocker.    LEUCOCYTOSIS  Resolved    ETOH ABUSE/DEPENDENCE  Not withdrawing.     DISPO  Will be in hospital 1-2 days      JABIER ALY MD   Pg [phone]    DVT Prhylaxis: Low Risk/Ambulatory with no VTE prophylaxis indicated  Code Status: Full Code    ROS:  As described in A/P and Exam.  Otherwise ALL are  negative.    PHYSICAL EXAM:  All vitals have been reviewed    Blood pressure (!) 167/106, pulse 67, temperature 99.2  F (37.3  C), temperature source Oral, resp. rate 16, height 1.854 m (6' 1\"), weight 83.9 kg (185 lb), SpO2 96 %.    I/O this shift:  In: -   Out: 750 [Urine:750]    GENERAL APPEARANCE: healthy, alert and no distress  EYES: conjunctiva clear, eyes grossly normal  HENT: external ears and nose normal   NECK: supple, no masses or adenopathy  RESP: lungs clear to auscultation - no rales, rhonchi or wheezes  CV: regular rate and rhythm, normal S1 S2, no S3 or S4 and no murmur, click or rub   ABDOMEN: soft,mild tenderness epigastrium, no HSM or masses and bowel sounds normal  MS: no clubbing, cyanosis; no edema  SKIN: clear without significant rashes or lesions  NEURO: -non-focal moves all 4 " extr    ROUTINE  LABS (Last four results)  CMP  Recent Labs  Lab 07/11/18  0547 07/10/18  1550    137   POTASSIUM 4.1 3.3*   CHLORIDE 104 99   CO2 28 29   ANIONGAP 7 9   * 159*   BUN 7 10   CR 1.03 1.09   GFRESTIMATED 83 77   GFRESTBLACK >90 >90   LOWELL 7.6* 9.1   PROTTOTAL 5.9* 7.9   ALBUMIN 2.9* 3.9   BILITOTAL 1.2 1.2   ALKPHOS 141 200*   AST 46* 92*   ALT 63 102*     CBC  Recent Labs  Lab 07/11/18  0547 07/10/18  1550   WBC 7.0 13.0*   RBC 4.63 5.22   HGB 14.5 16.5   HCT 42.6 47.1   MCV 92 90   MCH 31.3 31.6   MCHC 34.0 35.0   RDW 12.4 12.2    251     INRNo lab results found in last 7 days.  Arterial Blood GasNo lab results found in last 7 days.    Recent Results (from the past 24 hour(s))   Chest XR,  PA & LAT    Narrative    XR CHEST 2 VW 7/10/2018 4:11 PM    HISTORY: Chest pain.    COMPARISON: None.    FINDINGS: No airspace consolidation, pleural effusion or pneumothorax.  Normal heart size.      Impression    IMPRESSION: No acute cardiopulmonary abnormality.    LIZA AGUILAR MD   Abdomen US, limited (RUQ only)    Narrative    ULTRASOUND ABDOMEN LIMITED RIGHT UPPER QUADRANT   7/10/2018 5:34 PM     HISTORY: Epigastric and right upper quadrant pain.    COMPARISON: None.    FINDINGS: Moderate diffuse increased hepatic echogenicity, likely  representing fatty infiltration. No hepatic masses. The gallbladder is  unremarkable without gallstones, wall thickening or pericholecystic  fluid. No focal tenderness over the gallbladder. No intra or  extrahepatic biliary dilatation. The common duct measures 0.5 cm in  diameter. The right kidney has normal size and echogenicity, measuring  10.4 cm in length. No right intrarenal collecting system dilatation,  calculi or masses. No free fluid in the upper right hemiabdomen.      Impression    IMPRESSION:   1. Unremarkable appearance of the gallbladder.  2. No biliary dilatation.  3. Moderate diffuse fatty infiltration of the liver.    GURU CABRALES MD   CT  Abdomen Pelvis w Contrast    Narrative    CT ABDOMEN AND PELVIS WITH CONTRAST   7/10/2018 6:09 PM     HISTORY: Elevated lipase. Severe abdominal pain.    COMPARISON: None.    TECHNIQUE: Following the uneventful administration of 97 mL Isovue-370  intravenous contrast, helical sections were acquired from the top of  the diaphragm through the pubic symphysis. Coronal reconstructions  were generated. Radiation dose for this scan was reduced using  automated exposure control, adjustment of the mA and/or kV according  to the patient's size, or iterative reconstruction technique.    FINDINGS:     ABDOMEN: Moderate diffuse fatty infiltration of the liver. The spleen  is unremarkable. Mild to moderate edema about the pancreatic head and  neck. Normal enhancement of the pancreatic parenchyma. No  circumscribed peripancreatic fluid collections. The adrenal glands and  kidneys are unremarkable. The gallbladder is present. No enlarged  lymph nodes in the upper abdomen.    Scan through the lower chest is unremarkable.    PELVIS: The small and large bowel are normal in caliber. The appendix  is unremarkable. No bowel wall thickening, pneumatosis or free  intraperitoneal gas. No enlarged lymph nodes or free fluid in the  pelvis. Tiny paraumbilical hernia containing fat.      Impression    IMPRESSION:   1. Acute pancreatitis: There is a mild to moderate amount of edema  about the pancreatic head and neck.  2. No evidence of pancreatic necrosis or pseudocyst.     GURU CABRALES MD

## 2018-07-11 NOTE — PLAN OF CARE
Problem: Patient Care Overview  Goal: Plan of Care/Patient Progress Review  Alert and oriented. VSS. Up independently.  Stated adequate pain control with PRN Dilaudid.  Tolerating clear liquids.  Wife at bedside.

## 2018-07-11 NOTE — H&P
[facility]    History and Physical  Hospital Medicine       Date of Admission:  7/10/2018  Date of Service: 7/10/2018     Assessment & Plan   Ed Mckenzie is a 33 year old male with past medical history gout who presents with epigastric pain starting at noon today    Epigastric pain  Acute pancreatitis without infection or necrosis  Initially RUQ pain, now epigastric. No nausea or emesis. Associated diaphoresis, shortness of breath and tingling hands with pain. Initially concern for ACS, given ASA in ED, though troponin normal and ECG without ischemic changes. CXR negative. Suspect symptoms associated with pain due to pancreatitis as labs revealed lipase of 2358. Alk phos 200, , AST 92. WBC 13.0. CT showed pancreatitis without necrosis or cyst. US RUQ did not show any evidence for gallstones. Patient does drink 2-3 drinks most nights for the past 6 months or so, alcohol related pancreatitis possible. ETOH negative in ED. Takes omeprazole daily, which is a class Ib medication for pancreatitis risk. Received 2.5 L of NS in ED.  - AM CBC, CMP, lipase  - IVF: LR at 150 cc/hr  - pain management: IV dilaudid while NPO  - Nausea protocol  - Encourage alcohol cessation   - Add on triglycerides  - Hold omeprazole    Elevated Blood Pressure without history of hypertension  Blood pressure elevated at 185/110, no prior history of hypertension. Received clonidine in the ED, which did lower pressure. Suspect associated with pain. Low suspicion for alcohol withdrawal at this time.  - IV labetalol 20 mg prn SBP > 180 or DBP > 120   - pain management as above  - CIWA as below  - monitor    Leukocytosis  WBC 13.0. Suspect stress related with pancreatitis as above. Afebrile.  - AM CBC    At risk alcohol use  Reports drinking 2-3 drinks per night. Last drink yesterday, ETOH negative in ED. No history of withdrawal or seizures. On admission, hypertensive, though suspect associated with pain. No  other signs or symptoms of withdrawal.  - CIWA protocol in place with PRN ativan  - IV thiamine, folic acid, multivitamin, magnesium/phosphorus therapy initiated  - monitor CBC, CMP    Nicotine Use Disorder: Trying to quit. Has been using lozenges. Patch available.    FEN:  - IVF: LR at 150 cc/hr  - Potassium protocol  - NPO for now    DVT Prophylaxis: Low Risk/Ambulatory with no VTE prophylaxis indicated  Code Status: Full Code    Disposition: Anticipate discharge in 2-3 days once tolerating adequate oral intake, pain controlled and labs normalizing. Appropriate for inpatient level care.    I have discussed patient with Dr. Estuardo Valencia.    Arabella Thorne PA-C  [facility] Medicine        Primary Care Physician   Chelle Rashid [phone]    History is obtained from the patient, ED notes and review of the EMR.    Past Medical History    No past medical history on file.  Patient Active Problem List    Diagnosis Date Noted     Acute pancreatitis without infection or necrosis 07/10/2018     Priority: Medium     Acute idiopathic gout of multiple sites 10/16/2017     Priority: Medium      Past Surgical History   No past surgical history on file.     History of Present Illness   Ed Mckenzie is a 33 year old male who presents with abdominal pain starting this afternoon around noon.    Pain started on right side, slowly migrated to the center. Constant. Described as sharp, throbbing. No radiation. Associated diaphoresis, tingling in his hands, shortness of breath with the pain. Lying down makes it better. Sitting up makes it somewhat worse. No nausea or emesis with this. Reported heart burn on Monday. Takes omeprazole daily for this. Felt similar to prior heart burn that he has had.     Recently tried to quit smoking. Has been using the nicorette throat lozenges. Did smoke a few cigarettes due to recent stress.     Lately has 2-3 cocktails on a given night for about the past 6 months. More on the weekends.      Patient denies fever, chills, myalgias, lightheadedness, dizziness, cough, congestion, rhinorrhea, sore throat, palpitations, chest pain, diarrhea, constipation, changes in urination (dysuria, changes in frequency/urgency), rash or wounds.    Prior to Admission Medications   Prior to Admission Medications   Prescriptions Last Dose Informant Patient Reported? Taking?   Multiple Vitamin (ONE-A-DAY MENS PO) 7/10/2018 at am Self Yes Yes   Sig: Take 2 chew tab by mouth daily   Omeprazole Magnesium (PRILOSEC OTC PO) 7/10/2018 at am Self Yes Yes   Sig: Take 1 tablet by mouth daily    allopurinol (ZYLOPRIM) 100 MG tablet More than a month at Unknown time Self No No   Sig: TAKE 1 TABLET (100 MG) BY MOUTH DAILY   colchicine (COLCRYS) 0.6 MG tablet More than a month at Unknown time Self No No   Si tabs at the onset of flare, then 1 tab in 2 hrs   ibuprofen 200 MG capsule 2018 Self Yes Yes   Sig: Take 800 mg by mouth every 8 hours as needed for fever       Facility-Administered Medications: None     Allergies   Allergies   Allergen Reactions     Penicillins Anaphylaxis     Family History    Family History   Problem Relation Age of Onset     Breast Cancer Mother        Social History   Social History     Social History     Marital status: Single     Spouse name: N/A     Number of children: N/A     Years of education: N/A     Occupational History     Not on file.     Social History Main Topics     Smoking status: Current Every Day Smoker     Packs/day: 0.50     Smokeless tobacco: Never Used     Alcohol use Yes     Drug use: No     Sexual activity: Not on file     Other Topics Concern     Parent/Sibling W/ Cabg, Mi Or Angioplasty Before 65f 55m? No     Social History Narrative    Lives in [place] with wife and dog.     Review of Systems   The 10 point Review of Systems is negative other than noted in the HPI or here.     Physical Exam   BP (!) 185/110  Pulse 63  Temp 98.3  F (36.8  C) (Oral)  Resp (!) 6  Ht  "1.854 m (6' 1\")  Wt 83.9 kg (185 lb)  SpO2 95%  BMI 24.41 kg/m2     Weight: 185 lbs 0 oz Body mass index is 24.41 kg/(m^2).     Constitutional: Patient is lying down comfortably on exam. Alert & oriented. Pleasant & cooperative. No apparent distress. Appears nontoxic. Appears stated age.  Eyes: Sclera are anicteric, EOMI  HENT: Normocephalic. Atraumatic. Oropharynx is clear and moist.   Lymph/Hematologic: No epitrochlear, axillary, preauricular, postauricular, occipital, sub-mandibular, tonsillar, sub-mental, anterior or posterior cervical, or supraclavicular lymphadenopathy is appreciated.  Cardiovascular: Regular rate and normal rhythm. No murmur, rubs or gallops noted. Radial pulses are 2+ bilaterally. Distal pulses are intact. No lower extremity edema.  Respiratory: No accessory muscle usage. Speaking in full sentences. Clear to auscultation bilaterally without wheezes, crackles or rhonchi.  GI: Normal bowel sounds present, soft, non-distended. No tenderness elicited on exam. No rebound or guarding.   Genitourinary: Deferred  Musculoskeletal: Normal muscle bulk and tone. Moves all extremities appropriately.  Skin: Warm and dry, no rashes.   Neurologic: Neck supple. Cranial nerves 3-12 are grossly intact.  is symmetric.     Data   Data reviewed today:     Recent Labs  Lab 07/10/18  1550   WBC 13.0*   HGB 16.5   MCV 90         POTASSIUM 3.3*   CHLORIDE 99   CO2 29   BUN 10   CR 1.09   ANIONGAP 9   LOWELL 9.1   *   ALBUMIN 3.9   PROTTOTAL 7.9   BILITOTAL 1.2   ALKPHOS 200*   *   AST 92*   LIPASE 2358*   TROPI <0.015       Recent Results (from the past 24 hour(s))   Chest XR,  PA & LAT    Narrative    XR CHEST 2 VW 7/10/2018 4:11 PM    HISTORY: Chest pain.    COMPARISON: None.    FINDINGS: No airspace consolidation, pleural effusion or pneumothorax.  Normal heart size.      Impression    IMPRESSION: No acute cardiopulmonary abnormality.    LIZA AGUILAR MD   Abdomen US, limited (RUQ " only)    Narrative    ULTRASOUND ABDOMEN LIMITED RIGHT UPPER QUADRANT   7/10/2018 5:34 PM     HISTORY: Epigastric and right upper quadrant pain.    COMPARISON: None.    FINDINGS: Moderate diffuse increased hepatic echogenicity, likely  representing fatty infiltration. No hepatic masses. The gallbladder is  unremarkable without gallstones, wall thickening or pericholecystic  fluid. No focal tenderness over the gallbladder. No intra or  extrahepatic biliary dilatation. The common duct measures 0.5 cm in  diameter. The right kidney has normal size and echogenicity, measuring  10.4 cm in length. No right intrarenal collecting system dilatation,  calculi or masses. No free fluid in the upper right hemiabdomen.      Impression    IMPRESSION:   1. Unremarkable appearance of the gallbladder.  2. No biliary dilatation.  3. Moderate diffuse fatty infiltration of the liver.    GURU CABRALES MD   CT Abdomen Pelvis w Contrast    Narrative    CT ABDOMEN AND PELVIS WITH CONTRAST   7/10/2018 6:09 PM     HISTORY: Elevated lipase. Severe abdominal pain.    COMPARISON: None.    TECHNIQUE: Following the uneventful administration of 97 mL Isovue-370  intravenous contrast, helical sections were acquired from the top of  the diaphragm through the pubic symphysis. Coronal reconstructions  were generated. Radiation dose for this scan was reduced using  automated exposure control, adjustment of the mA and/or kV according  to the patient's size, or iterative reconstruction technique.    FINDINGS:     ABDOMEN: Moderate diffuse fatty infiltration of the liver. The spleen  is unremarkable. Mild to moderate edema about the pancreatic head and  neck. Normal enhancement of the pancreatic parenchyma. No  circumscribed peripancreatic fluid collections. The adrenal glands and  kidneys are unremarkable. The gallbladder is present. No enlarged  lymph nodes in the upper abdomen.    Scan through the lower chest is unremarkable.    PELVIS: The small and  large bowel are normal in caliber. The appendix  is unremarkable. No bowel wall thickening, pneumatosis or free  intraperitoneal gas. No enlarged lymph nodes or free fluid in the  pelvis. Tiny periumbilical hernia containing fat.      Impression    IMPRESSION:   1. Acute pancreatitis: There is a mild to moderate amount of edema  about the pancreatic head and neck.  2. No evidence of pancreatic necrosis or pseudocyst.      I personally reviewed the EKG tracing showing NSR with some rate variation, no acute ST or T wave changes.    I have discussed patient with Dr. Estuardo Valencia    Chart documentation with keystrokes and/or Dragon voice recognition software. Although reviewed after completion, some word and grammatical error may remain.  Arabella Thorne PAAnushaFulton County Health Center Medicine

## 2018-07-11 NOTE — ED NOTES
"Patient has  [place] to Observation  order. Patient has been given the Observation brochure -  What does Observation mean to me.\"  Patient has been given the opportunity to ask questions about observation status and their plan of care.      Manpreet Richards  "

## 2018-07-11 NOTE — PLAN OF CARE
"Problem: Pain, Acute (Adult)  Goal: Acceptable Pain Control/Comfort Level  Patient will demonstrate the desired outcomes by discharge/transition of care.   Outcome: Improving  Patient is alert, oriented, independent. Prn dilaudid and oxycodone given for abdominal pain. LS clear. IV infusing. UA sent. K+ replaced overnight. Denies nausea, SOB. BP (!) 176/106  Pulse 70  Temp 98.2  F (36.8  C) (Oral)  Resp 20  Ht 1.854 m (6' 1\")  Wt 83.9 kg (185 lb)  SpO2 99%  BMI 24.41 kg/m2        "

## 2018-07-11 NOTE — PROGRESS NOTES
"[place] [facility] ADMISSION NOTE    Patient admitted to room 2404 at approximately 2130 via wheel chair from emergency room. Patient was accompanied by spouse and transport tech.     Verbal SBAR report received from ALEJANDRA Lea prior to patient arrival.     Patient ambulated to bed independently. Patient alert and oriented X 3. Pain is controlled with current analgesics.  Medication(s) being used: narcotic analgesics including hydromorphone (Dilaudid). 0-10 Pain Scale: 7. Admission vital signs: Blood pressure (!) 176/106, pulse 70, temperature 98.2  F (36.8  C), temperature source Oral, resp. rate 20, height 1.854 m (6' 1\"), weight 83.9 kg (185 lb), SpO2 99 %. Patient was oriented to plan of care, call light, bed controls, tv, telephone, bathroom and visiting hours.     Risk Assessment    The following safety risks were identified during admission: none. Yellow risk band applied: NO.     Skin Initial Assessment    This writer admitted this patient and completed a full skin assessment and Carrington score in the Adult PCS flowsheet. Appropriate interventions initiated as needed.     Secondary skin check completed by Pt refused skin assessment.    Skin  Inspection of bony prominences: Unable to assess  Skin WDL: WDL    Carrington Risk Assessment  Sensory Perception: 4-->no impairment  Moisture: 4-->rarely moist  Activity: 4-->walks frequently  Mobility: 4-->no limitation  Nutrition: 3-->adequate  Friction and Shear: 3-->no apparent problem  Carrington Score: 22  Bed Support Surface: Atmos Air mattress    Leanna Duke"

## 2018-07-12 ENCOUNTER — APPOINTMENT (OUTPATIENT)
Dept: CT IMAGING | Facility: CLINIC | Age: 33
DRG: 440 | End: 2018-07-12
Attending: INTERNAL MEDICINE
Payer: COMMERCIAL

## 2018-07-12 VITALS
HEIGHT: 73 IN | SYSTOLIC BLOOD PRESSURE: 149 MMHG | OXYGEN SATURATION: 97 % | TEMPERATURE: 99.2 F | BODY MASS INDEX: 24.52 KG/M2 | DIASTOLIC BLOOD PRESSURE: 93 MMHG | RESPIRATION RATE: 18 BRPM | HEART RATE: 90 BPM | WEIGHT: 185 LBS

## 2018-07-12 LAB
ANION GAP SERPL CALCULATED.3IONS-SCNC: 7 MMOL/L (ref 3–14)
BUN SERPL-MCNC: 6 MG/DL (ref 7–30)
CALCIUM SERPL-MCNC: 8.4 MG/DL (ref 8.5–10.1)
CHLORIDE SERPL-SCNC: 102 MMOL/L (ref 94–109)
CO2 SERPL-SCNC: 29 MMOL/L (ref 20–32)
CREAT SERPL-MCNC: 0.97 MG/DL (ref 0.66–1.25)
ERYTHROCYTE [DISTWIDTH] IN BLOOD BY AUTOMATED COUNT: 12.2 % (ref 10–15)
GFR SERPL CREATININE-BSD FRML MDRD: 89 ML/MIN/1.7M2
GLUCOSE SERPL-MCNC: 104 MG/DL (ref 70–99)
HCT VFR BLD AUTO: 45.5 % (ref 40–53)
HGB BLD-MCNC: 15.6 G/DL (ref 13.3–17.7)
LIPASE SERPL-CCNC: 1287 U/L (ref 73–393)
MCH RBC QN AUTO: 31.7 PG (ref 26.5–33)
MCHC RBC AUTO-ENTMCNC: 34.3 G/DL (ref 31.5–36.5)
MCV RBC AUTO: 93 FL (ref 78–100)
PLATELET # BLD AUTO: 179 10E9/L (ref 150–450)
POTASSIUM SERPL-SCNC: 4 MMOL/L (ref 3.4–5.3)
RBC # BLD AUTO: 4.92 10E12/L (ref 4.4–5.9)
SODIUM SERPL-SCNC: 138 MMOL/L (ref 133–144)
WBC # BLD AUTO: 10.8 10E9/L (ref 4–11)

## 2018-07-12 PROCEDURE — 99239 HOSP IP/OBS DSCHRG MGMT >30: CPT | Performed by: INTERNAL MEDICINE

## 2018-07-12 PROCEDURE — 25000128 H RX IP 250 OP 636: Performed by: INTERNAL MEDICINE

## 2018-07-12 PROCEDURE — 36415 COLL VENOUS BLD VENIPUNCTURE: CPT | Performed by: INTERNAL MEDICINE

## 2018-07-12 PROCEDURE — 25000128 H RX IP 250 OP 636: Performed by: EMERGENCY MEDICINE

## 2018-07-12 PROCEDURE — 25000125 ZZHC RX 250: Performed by: RADIOLOGY

## 2018-07-12 PROCEDURE — 25000128 H RX IP 250 OP 636: Performed by: RADIOLOGY

## 2018-07-12 PROCEDURE — 85027 COMPLETE CBC AUTOMATED: CPT | Performed by: INTERNAL MEDICINE

## 2018-07-12 PROCEDURE — 71260 CT THORAX DX C+: CPT

## 2018-07-12 PROCEDURE — 83690 ASSAY OF LIPASE: CPT | Performed by: INTERNAL MEDICINE

## 2018-07-12 PROCEDURE — 25000132 ZZH RX MED GY IP 250 OP 250 PS 637: Performed by: INTERNAL MEDICINE

## 2018-07-12 PROCEDURE — 80048 BASIC METABOLIC PNL TOTAL CA: CPT | Performed by: INTERNAL MEDICINE

## 2018-07-12 RX ORDER — FUROSEMIDE 10 MG/ML
40 INJECTION INTRAMUSCULAR; INTRAVENOUS ONCE
Status: COMPLETED | OUTPATIENT
Start: 2018-07-12 | End: 2018-07-12

## 2018-07-12 RX ORDER — METOPROLOL SUCCINATE 50 MG/1
50 TABLET, EXTENDED RELEASE ORAL DAILY
Qty: 90 TABLET | Refills: 1 | Status: SHIPPED | OUTPATIENT
Start: 2018-07-12 | End: 2023-03-09

## 2018-07-12 RX ORDER — IOPAMIDOL 755 MG/ML
90 INJECTION, SOLUTION INTRAVASCULAR ONCE
Status: COMPLETED | OUTPATIENT
Start: 2018-07-12 | End: 2018-07-12

## 2018-07-12 RX ADMIN — METOPROLOL SUCCINATE 25 MG: 25 TABLET, EXTENDED RELEASE ORAL at 08:39

## 2018-07-12 RX ADMIN — IOPAMIDOL 90 ML: 755 INJECTION, SOLUTION INTRAVENOUS at 10:35

## 2018-07-12 RX ADMIN — SODIUM CHLORIDE 62 ML: 9 INJECTION, SOLUTION INTRAVENOUS at 10:35

## 2018-07-12 RX ADMIN — FUROSEMIDE 40 MG: 10 INJECTION, SOLUTION INTRAVENOUS at 12:22

## 2018-07-12 RX ADMIN — HYDROMORPHONE HYDROCHLORIDE 0.3 MG: 1 INJECTION, SOLUTION INTRAMUSCULAR; INTRAVENOUS; SUBCUTANEOUS at 01:48

## 2018-07-12 ASSESSMENT — PAIN DESCRIPTION - DESCRIPTORS: DESCRIPTORS: ACHING

## 2018-07-12 NOTE — PROGRESS NOTES
Had some right lower quadrant abdominal pain following regular breakfast. He went down for CT per MD order and now awaiting results.

## 2018-07-12 NOTE — PROGRESS NOTES
BP elevated, not meeting parameters for PRN labetalol at this time. Will continue to monitor closely. Denies chest pain, not SOA, no HA. Slight pain in back muscles. Patient verbalizes understanding to call for RN if symptoms change.

## 2018-07-12 NOTE — DISCHARGE SUMMARY
[facility] Hospitalist Discharge Summary    Ed Mckenzie MRN# [id number]   Age: 33 year old YOB: 1985     Date of Admission:  7/10/2018  Date of Discharge::  7/12/2018  Admitting Physician:  Estuardo Valencia MD  Discharge Physician:  Sakina Wills MD  Primary Physician: Chelle Rashid  Transferring Facility: N/A     Home clinic: [facility]          Admission Diagnoses:   Abdominal pain, epigastric [R10.13]  Acute pancreatitis without infection or necrosis [K85.90]  Acute pancreatitis without infection or necrosis, unspecified pancreatitis type [K85.90]          Discharge Diagnosis:   Principle diagnosis: acute alcoholic pancreatitis  Secondary diagnoses:  Patient Active Problem List   Diagnosis     Acute idiopathic gout of multiple sites     Acute pancreatitis without infection or necrosis     Pancreatitis          Brief History of Presenting Illness:   As per admit hx  Ed Mckenzie is a 33 year old male who presents with abdominal pain starting this afternoon around noon.     Pain started on right side, slowly migrated to the center. Constant. Described as sharp, throbbing. No radiation. Associated diaphoresis, tingling in his hands, shortness of breath with the pain. Lying down makes it better. Sitting up makes it somewhat worse. No nausea or emesis with this. Reported heart burn on Monday. Takes omeprazole daily for this. Felt similar to prior heart burn that he has had.      Recently tried to quit smoking. Has been using the nicorette throat lozenges. Did smoke a few cigarettes due to recent stress.      Lately has 2-3 cocktails on a given night for about the past 6 months. More on the weekends.      Patient denies fever, chills, myalgias, lightheadedness, dizziness, cough, congestion, rhinorrhea, sore throat, palpitations, chest pain, diarrhea, constipation, changes in urination (dysuria, changes in frequency/urgency), rash or wounds.       Recent Results (from the past  24 hour(s))   CT Chest/Abdomen/Pelvis w Contrast    Narrative    CT CHEST/ABDOMEN/PELVIS WITH CONTRAST  7/12/2018 10:50 AM    HISTORY:  Right lower quadrant pain, pancreatitis.     TECHNIQUE: CT scan obtained of the chest, abdomen, and pelvis with  oral and IV contrast. 90 mL Isovue-370 injected. Radiation dose for  this scan was reduced using automated exposure control, adjustment of  the mA and/or kV according to patient size, or iterative  reconstruction technique.    COMPARISON:  CT abdomen and pelvis 7/10/2018, ultrasound abdomen  7/10/2018.    FINDINGS:  Chest: No acute thoracic aortic abnormality. No evidence to suggest  pulmonary embolism, assessment limited involving the small branch  vessels of the pulmonary arterial tree. New diffuse moderate  esophageal distention with fluid. No enlarged lymph nodes by size  criteria. New small volume right greater than left pleural effusions.  Bibasilar atelectasis. No acute airspace disease identified. Likely  focal atelectasis superior segment right lower lobe, series 3 image  19.    Abdomen/pelvis: Again seen is edema and fluid surrounding the pancreas  that is increased in volume at the pancreatic head and neck regions.  There is homogeneous enhancement of the pancreatic parenchyma. No  pancreas necrosis currently seen. No pancreas calcifications or duct  dilatation currently seen. No biliary ductal dilatation. There is  fluid extending to the right lower quadrant of increasing volume at  the right pericolic gutter region, series 2 image 89. There is new  moderate pelvic ascites. The stomach is moderately distended with  fluid.    Suggestion of fatty liver. Vicarious excretion of contrast into the  gallbladder lumen incidentally seen. Adrenals, spleen, and kidneys  show no acute abnormality. Normal appendix. No free air. Remainder of  the bowel shows no acute abnormality.      Impression    IMPRESSION:  1. Acute pancreatitis, with increased edema and fluid adjacent  to the  pancreas head and neck regions. Increasing ascites at the right  pericolic gutter and new moderate ascites of the pelvis. No pancreas  necrosis or duct dilatation is seen currently.  2. New but small volume right greater than left pleural effusions.  3. New moderate esophagus distention and distention of the stomach.  4. Fatty liver.     CT ABD-IMPRESSION:   1. Acute pancreatitis: There is a mild to moderate amount of edema  about the pancreatic head and neck.  2. No evidence of pancreatic necrosis or pseudocyst.             Hospital Course:   ACUTE ALCOHOLIC PANCREATITIS  Presented with abdominal pain, elevated lipase. CT as above. US negative. Had leucocytosis. Received  2.5 L fluids in ED and was admitted with iv fluids at 150cc/hr.  Had RLQ tenderness, mild abdominal distension--repeat CT as above. Giving one dose iv lasix. Other wise Pain lot better and lipase improved. Has been tolerating regular diet.     HTN  No prior hx but BP has been as high as 185/110. Could be ETOH/ pain but BP was elevated at 160-177/ 100-110. Started  Bblocker. BP better  -will d/c on toprol xl 50/day. Discussed with him on OP f/u. Being a young person--if he is tired all the time on this med, could consider changing in future. Right now tachycardic/ hypertensive-hence will continue.      LEUCOCYTOSIS  Resolved     ETOH ABUSE/DEPENDENCE  Not withdrawing.      DISPO  Home today          Procedures:   No procedures performed during this admission         Allergies:      Allergies   Allergen Reactions     Penicillins Anaphylaxis             Medications Prior to Admission:     Prescriptions Prior to Admission   Medication Sig Dispense Refill Last Dose     ibuprofen 200 MG capsule Take 800 mg by mouth every 8 hours as needed for fever    7/7/2018     Multiple Vitamin (ONE-A-DAY MENS PO) Take 2 chew tab by mouth daily   7/10/2018 at am     Omeprazole Magnesium (PRILOSEC OTC PO) Take 1 tablet by mouth daily    7/10/2018 at am      "allopurinol (ZYLOPRIM) 100 MG tablet TAKE 1 TABLET (100 MG) BY MOUTH DAILY 30 tablet 0 More than a month at Unknown time     colchicine (COLCRYS) 0.6 MG tablet 2 tabs at the onset of flare, then 1 tab in 2 hrs 3 tablet 0 More than a month at Unknown time             Discharge Medications:     Current Discharge Medication List      START taking these medications    Details   metoprolol succinate (TOPROL-XL) 50 MG 24 hr tablet Take 1 tablet (50 mg) by mouth daily  Qty: 90 tablet, Refills: 1    Associated Diagnoses: Essential hypertension         CONTINUE these medications which have NOT CHANGED    Details   ibuprofen 200 MG capsule Take 800 mg by mouth every 8 hours as needed for fever       Multiple Vitamin (ONE-A-DAY MENS PO) Take 2 chew tab by mouth daily      Omeprazole Magnesium (PRILOSEC OTC PO) Take 1 tablet by mouth daily       allopurinol (ZYLOPRIM) 100 MG tablet TAKE 1 TABLET (100 MG) BY MOUTH DAILY  Qty: 30 tablet, Refills: 0    Associated Diagnoses: Acute idiopathic gout of multiple sites      colchicine (COLCRYS) 0.6 MG tablet 2 tabs at the onset of flare, then 1 tab in 2 hrs  Qty: 3 tablet, Refills: 0    Associated Diagnoses: Acute idiopathic gout of multiple sites                   Consultations:   No consultations were requested during this admission            Discharge Exam:   Blood pressure (!) 149/93, pulse 90, temperature 99.2  F (37.3  C), temperature source Oral, resp. rate 18, height 1.854 m (6' 1\"), weight 83.9 kg (185 lb), SpO2 97 %.  GENERAL APPEARANCE: healthy, alert and no distress  EYES: conjunctiva clear, eyes grossly normal  HENT: external ears and nose normal   NECK: supple, no masses or adenopathy  RESP: lungs clear to auscultation - no rales, rhonchi or wheezes  CV: regular rate and rhythm, normal S1 S2, no S3 or S4 and no murmur, click or rub   ABDOMEN: soft, mildly distended, mild RLQ tenderness, no HSM or masses and bowel sounds normal  MS: no clubbing, cyanosis; no edema  SKIN: " clear without significant rashes or lesions  NEURO: Normal strength and tone, sensory exam grossly normal, mentation intact and speech normal    Unresulted Labs Ordered in the Past 30 Days of this Admission     No orders found from 5/11/2018 to 7/11/2018.          Recent Results (from the past 24 hour(s))   CT Chest/Abdomen/Pelvis w Contrast    Narrative    CT CHEST/ABDOMEN/PELVIS WITH CONTRAST  7/12/2018 10:50 AM    HISTORY:  Right lower quadrant pain, pancreatitis.     TECHNIQUE: CT scan obtained of the chest, abdomen, and pelvis with  oral and IV contrast. 90 mL Isovue-370 injected. Radiation dose for  this scan was reduced using automated exposure control, adjustment of  the mA and/or kV according to patient size, or iterative  reconstruction technique.    COMPARISON:  CT abdomen and pelvis 7/10/2018, ultrasound abdomen  7/10/2018.    FINDINGS:  Chest: No acute thoracic aortic abnormality. No evidence to suggest  pulmonary embolism, assessment limited involving the small branch  vessels of the pulmonary arterial tree. New diffuse moderate  esophageal distention with fluid. No enlarged lymph nodes by size  criteria. New small volume right greater than left pleural effusions.  Bibasilar atelectasis. No acute airspace disease identified. Likely  focal atelectasis superior segment right lower lobe, series 3 image  19.    Abdomen/pelvis: Again seen is edema and fluid surrounding the pancreas  that is increased in volume at the pancreatic head and neck regions.  There is homogeneous enhancement of the pancreatic parenchyma. No  pancreas necrosis currently seen. No pancreas calcifications or duct  dilatation currently seen. No biliary ductal dilatation. There is  fluid extending to the right lower quadrant of increasing volume at  the right pericolic gutter region, series 2 image 89. There is new  moderate pelvic ascites. The stomach is moderately distended with  fluid.    Suggestion of fatty liver. Vicarious excretion  of contrast into the  gallbladder lumen incidentally seen. Adrenals, spleen, and kidneys  show no acute abnormality. Normal appendix. No free air. Remainder of  the bowel shows no acute abnormality.      Impression    IMPRESSION:  1. Acute pancreatitis, with increased edema and fluid adjacent to the  pancreas head and neck regions. Increasing ascites at the right  pericolic gutter and new moderate ascites of the pelvis. No pancreas  necrosis or duct dilatation is seen currently.  2. New but small volume right greater than left pleural effusions.  3. New moderate esophagus distention and distention of the stomach.  4. Fatty liver.            Pending Tests at Discharge:   None         Discharge Instructions and Follow-Up:   Discharge diet: Regular   Discharge activity: Activity as tolerated   Discharge follow-up: Follow up with primary care provider in 1-2 weeks           Discharge Disposition:   Discharged to home      Attestation:  I have reviewed today's vital signs, notes, medications, labs and imaging.    Time Spent on this Encounter   I, Sakina Wills, personally saw the patient today and spent greater than 30 minutes discharging this patient.    Sakina Wills MD

## 2018-07-12 NOTE — PROVIDER NOTIFICATION
"   07/12/18 0821   Vital Signs   Heart Rate 133   Pulse/Heart Rate Source Monitor   BP (!) 144/101   Patient up pacing in [place]. States he \"can't sleep, that bed is uncomfortable and my lower back is hurting\". He is hungry. Wants more to eat. Says he is \"nervous\". CIWA 3 . Is urinating a lot. His abdomen appears firm, he reports some pain RLQ abdomen, but feels that it is from the position of the bed. Spoke with Dr Wills and we will advance his diet and SL at this time.   "

## 2018-07-12 NOTE — PROGRESS NOTES
[facility] DISCHARGE NOTE    Patient discharged to home at 1330 via ambulation. Accompanied by spouse and staff. Discharge instructions reviewed with patient, opportunity offered to ask questions. Prescriptions sent to patients preferred pharmacy. All belongings sent with patient.    Patient stayed inpatient for one hour following IV lasix dose. He said he urinated at least three times and feels better. Follow up MD appointment made. Understands discharge plan of care.     Janie Gonzalez RN

## 2018-07-12 NOTE — PLAN OF CARE
Problem: Patient Care Overview  Goal: Plan of Care/Patient Progress Review  Outcome: No Change  Patient afebrile, room air. Elevated BPs have not yet met criteria for PRN labetalol. Tolerating clears. Denies chest pain, not SOA. Pain into lower back, PRN IV dilaudid given; pt feels 0.5mg dose is too much, does well with 0.3mg dose. CIWA 0.

## 2018-07-12 NOTE — PHARMACY - DISCHARGE MEDICATION RECONCILIATION
Discharge medication review for this patient is complete. Pharmacist assisted with medication reconciliation of discharge medications with prior to admission medications.     The following changes were made to the discharge medication list based on pharmacist review:  Added:  none  Discontinued: none  Changed: none      Patient's Discharge Medication List  - medications as listed on After Visit Summary (AVS)     Review of your medicines      START taking       Dose / Directions    metoprolol succinate 50 MG 24 hr tablet   Commonly known as:  TOPROL-XL   Used for:  Essential hypertension        Dose:  50 mg   Take 1 tablet (50 mg) by mouth daily   Quantity:  90 tablet   Refills:  1         CONTINUE these medicines which have NOT CHANGED       Dose / Directions    allopurinol 100 MG tablet   Commonly known as:  ZYLOPRIM   Used for:  Acute idiopathic gout of multiple sites        TAKE 1 TABLET (100 MG) BY MOUTH DAILY   Quantity:  30 tablet   Refills:  0       colchicine 0.6 MG tablet   Commonly known as:  COLCRYS   Used for:  Acute idiopathic gout of multiple sites        2 tabs at the onset of flare, then 1 tab in 2 hrs   Quantity:  3 tablet   Refills:  0       ibuprofen 200 MG capsule        Dose:  800 mg   Take 800 mg by mouth every 8 hours as needed for fever   Refills:  0       ONE-A-DAY MENS PO        Dose:  2 chew tab   Take 2 chew tab by mouth daily   Refills:  0       PRILOSEC OTC PO        Dose:  1 tablet   Take 1 tablet by mouth daily   Refills:  0            Where to get your medicines      These medications were sent to [place] [id number] [place] [address] [address]     Phone:  [phone]      metoprolol succinate 50 MG 24 hr tablet

## 2018-07-13 ENCOUNTER — TELEPHONE (OUTPATIENT)
Dept: FAMILY MEDICINE | Facility: CLINIC | Age: 33
End: 2018-07-13

## 2018-07-13 NOTE — TELEPHONE ENCOUNTER
ED / Discharge Outreach Protocol    Patient Contact    Attempt # 1    Was call answered?  No.  Left message on voicemail with information to call me back.         Discharge Instructions and Follow-Up:   Discharge diet: Regular   Discharge activity: Activity as tolerated   Discharge follow-up: Follow up with primary care provider in 1-2 weeks     Patient has follow up scheduled for 07/18/18 with PCP.      Glo LUTZ RN

## 2018-07-13 NOTE — TELEPHONE ENCOUNTER
ED/UC/IP follow up phone call:   07/12/18  Epigastric Abd Pain    RN please call to follow up    Number of ED visits in past 12 mths:   0    Monique Sorenson  Clinic [facility] [place]

## 2018-07-13 NOTE — TELEPHONE ENCOUNTER
"Hospital/TCU/ED for chronic condition Discharge Protocol    \"Hi, my name is Leanna Marroquin, a registered nurse, and I am calling from [facility].  I am calling to follow up and see how things are going for you after your recent emergency visit/hospital/TCU stay.\"    Tell me how you are doing now that you are home?\" Pt states that he is doing really well and steadily improving.      Discharge Instructions    \"Let's review your discharge instructions.  What is/are the follow-up recommendations?  Pt. Response: Reviewed with pt.    \"Has an appointment with your primary care provider been scheduled?\"   Yes. (confirm)    \"When you see the provider, I would recommend that you bring your medications with you.\"    Medications    \"Tell me what changed about your medicines when you discharged?\"    Changes to chronic meds?    0-1    \"What questions do you have about your medications?\"    None     New diagnoses of heart failure, COPD, diabetes, or MI?    No              Medication reconciliation completed? Yes  Was MTM referral placed (*Make sure to put transitions as reason for referral)?   No    Call Summary    \"What questions or concerns do you have about your recent visit and your follow-up care?\"     none    \"If you have questions or things don't continue to improve, we encourage you contact us through the main clinic number (give number).  Even if the clinic is not open, triage nurses are available 24/7 to help you.     We would like you to know that our clinic has extended hours (provide information).  We also have urgent care (provide details on closest location and hours/contact info)\"      \"Thank you for your time and take care!\"             "

## 2018-07-18 ENCOUNTER — OFFICE VISIT (OUTPATIENT)
Dept: FAMILY MEDICINE | Facility: CLINIC | Age: 33
End: 2018-07-18
Payer: COMMERCIAL

## 2018-07-18 VITALS
TEMPERATURE: 98.8 F | HEART RATE: 88 BPM | WEIGHT: 182 LBS | SYSTOLIC BLOOD PRESSURE: 136 MMHG | BODY MASS INDEX: 24.12 KG/M2 | DIASTOLIC BLOOD PRESSURE: 88 MMHG | HEIGHT: 73 IN | OXYGEN SATURATION: 98 %

## 2018-07-18 DIAGNOSIS — K76.0 FATTY LIVER: ICD-10-CM

## 2018-07-18 DIAGNOSIS — I10 BENIGN ESSENTIAL HYPERTENSION: ICD-10-CM

## 2018-07-18 DIAGNOSIS — K85.90 ACUTE PANCREATITIS WITHOUT INFECTION OR NECROSIS, UNSPECIFIED PANCREATITIS TYPE: ICD-10-CM

## 2018-07-18 DIAGNOSIS — M10.09 ACUTE IDIOPATHIC GOUT OF MULTIPLE SITES: Primary | ICD-10-CM

## 2018-07-18 LAB
ALBUMIN SERPL-MCNC: 3.2 G/DL (ref 3.4–5)
ALP SERPL-CCNC: 193 U/L (ref 40–150)
ALT SERPL W P-5'-P-CCNC: 68 U/L (ref 0–70)
ANION GAP SERPL CALCULATED.3IONS-SCNC: 7 MMOL/L (ref 3–14)
AST SERPL W P-5'-P-CCNC: 36 U/L (ref 0–45)
BASOPHILS # BLD AUTO: 0.1 10E9/L (ref 0–0.2)
BASOPHILS NFR BLD AUTO: 1.1 %
BILIRUB DIRECT SERPL-MCNC: 0.2 MG/DL (ref 0–0.2)
BILIRUB SERPL-MCNC: 0.6 MG/DL (ref 0.2–1.3)
BUN SERPL-MCNC: 11 MG/DL (ref 7–30)
CALCIUM SERPL-MCNC: 9.1 MG/DL (ref 8.5–10.1)
CHLORIDE SERPL-SCNC: 106 MMOL/L (ref 94–109)
CO2 SERPL-SCNC: 25 MMOL/L (ref 20–32)
CREAT SERPL-MCNC: 1.03 MG/DL (ref 0.66–1.25)
CRP SERPL-MCNC: 39.3 MG/L (ref 0–8)
DIFFERENTIAL METHOD BLD: NORMAL
EOSINOPHIL # BLD AUTO: 0.5 10E9/L (ref 0–0.7)
EOSINOPHIL NFR BLD AUTO: 5.1 %
ERYTHROCYTE [DISTWIDTH] IN BLOOD BY AUTOMATED COUNT: 12.2 % (ref 10–15)
GFR SERPL CREATININE-BSD FRML MDRD: 83 ML/MIN/1.7M2
GLUCOSE SERPL-MCNC: 92 MG/DL (ref 70–99)
HCT VFR BLD AUTO: 42.4 % (ref 40–53)
HGB BLD-MCNC: 14.4 G/DL (ref 13.3–17.7)
LYMPHOCYTES # BLD AUTO: 2.2 10E9/L (ref 0.8–5.3)
LYMPHOCYTES NFR BLD AUTO: 21.2 %
MCH RBC QN AUTO: 31.8 PG (ref 26.5–33)
MCHC RBC AUTO-ENTMCNC: 34 G/DL (ref 31.5–36.5)
MCV RBC AUTO: 94 FL (ref 78–100)
MONOCYTES # BLD AUTO: 1.2 10E9/L (ref 0–1.3)
MONOCYTES NFR BLD AUTO: 11.2 %
NEUTROPHILS # BLD AUTO: 6.3 10E9/L (ref 1.6–8.3)
NEUTROPHILS NFR BLD AUTO: 61.4 %
PLATELET # BLD AUTO: 408 10E9/L (ref 150–450)
POTASSIUM SERPL-SCNC: 4 MMOL/L (ref 3.4–5.3)
PROT SERPL-MCNC: 8 G/DL (ref 6.8–8.8)
RBC # BLD AUTO: 4.53 10E12/L (ref 4.4–5.9)
SODIUM SERPL-SCNC: 138 MMOL/L (ref 133–144)
TSH SERPL DL<=0.005 MIU/L-ACNC: 3.1 MU/L (ref 0.4–4)
URATE SERPL-MCNC: 9.3 MG/DL (ref 3.5–7.2)
WBC # BLD AUTO: 10.3 10E9/L (ref 4–11)

## 2018-07-18 PROCEDURE — 80076 HEPATIC FUNCTION PANEL: CPT | Performed by: NURSE PRACTITIONER

## 2018-07-18 PROCEDURE — 84443 ASSAY THYROID STIM HORMONE: CPT | Performed by: NURSE PRACTITIONER

## 2018-07-18 PROCEDURE — 80048 BASIC METABOLIC PNL TOTAL CA: CPT | Performed by: NURSE PRACTITIONER

## 2018-07-18 PROCEDURE — 86140 C-REACTIVE PROTEIN: CPT | Performed by: NURSE PRACTITIONER

## 2018-07-18 PROCEDURE — 83516 IMMUNOASSAY NONANTIBODY: CPT | Performed by: NURSE PRACTITIONER

## 2018-07-18 PROCEDURE — 36415 COLL VENOUS BLD VENIPUNCTURE: CPT | Performed by: NURSE PRACTITIONER

## 2018-07-18 PROCEDURE — 85025 COMPLETE CBC W/AUTO DIFF WBC: CPT | Performed by: NURSE PRACTITIONER

## 2018-07-18 PROCEDURE — 84550 ASSAY OF BLOOD/URIC ACID: CPT | Performed by: NURSE PRACTITIONER

## 2018-07-18 PROCEDURE — 99495 TRANSJ CARE MGMT MOD F2F 14D: CPT | Performed by: NURSE PRACTITIONER

## 2018-07-18 NOTE — NURSING NOTE
"Initial /88  Pulse 88  Temp 98.8  F (37.1  C) (Tympanic)  Ht 6' 1\" (1.854 m)  Wt 182 lb (82.6 kg)  SpO2 98%  BMI 24.01 kg/m2 Estimated body mass index is 24.01 kg/(m^2) as calculated from the following:    Height as of this encounter: 6' 1\" (1.854 m).    Weight as of this encounter: 182 lb (82.6 kg). .    Melody Tabor CMA ([facility])  "

## 2018-07-18 NOTE — PROGRESS NOTES
SUBJECTIVE:   Ed Mckenzie is a 33 year old male who presents to clinic today for the following health issues:        Hospital Follow-up Visit:    Hospital/Nursing Home/IP Rehab Facility: [facility]  Date of Admission: 7/10/18  Date of Discharge: 7/12/18  Reason(s) for Admission: Abdominal pain - started with heartburn then developed substernal, epigastric pain.  Admitted 7/10 with acute pancreatitis.  Lately drinking 2-3 cocktails most nights for the past 6 months.  More stress lately - which is why drinking more lately.         Hypertensive on admission - started on beta blocker while inpatient.  GERD - taking Omeprazole over the counter dose - 20 mg once daily.  Symptoms were well managed.    No family history of pancreatitis/disease, liver disease, concerns.         Problems taking medications regularly:  None       Medication changes since discharge: Metformin        Problems adhering to non-medication therapy:  None     Summary of hospitalization:  [facility] discharge summary reviewed  Diagnostic Tests/Treatments reviewed.  Follow up needed: none  Other Healthcare Providers Involved in Patient s Care:         None  Update since discharge: improved.      Post Discharge Medication Reconciliation: discharge medications reconciled and changed, per note/orders (see AVS).  Plan of care communicated with patient and family     Coding guidelines for this visit:  Type of Medical   Decision Making Face-to-Face Visit       within 7 Days of discharge Face-to-Face Visit        within 14 days of discharge   Moderate Complexity [id number]   High Complexity [id number]              Problem list and histories reviewed & adjusted, as indicated.  Additional history: as documented    Patient Active Problem List   Diagnosis     Acute idiopathic gout of multiple sites     Acute pancreatitis without infection or necrosis     Pancreatitis     Benign essential hypertension     History reviewed. No pertinent  "surgical history.    Social History   Substance Use Topics     Smoking status: Current Every Day Smoker     Packs/day: 0.50     Smokeless tobacco: Never Used      Comment: currently using lozenges     Alcohol use Yes      Comment: 2-3 cocktails per night     Family History   Problem Relation Age of Onset     Breast Cancer Mother      Ovarian Cancer Mother 58         Current Outpatient Prescriptions   Medication Sig Dispense Refill     ibuprofen 200 MG capsule Take 800 mg by mouth every 8 hours as needed for fever        metoprolol succinate (TOPROL-XL) 50 MG 24 hr tablet Take 1 tablet (50 mg) by mouth daily 90 tablet 1     Multiple Vitamin (ONE-A-DAY MENS PO) Take 2 chew tab by mouth daily       Omeprazole Magnesium (PRILOSEC OTC PO) Take 1 tablet by mouth daily        Allergies   Allergen Reactions     Penicillins Anaphylaxis     Recent Labs   Lab Test  07/12/18   0610  07/11/18   0547  07/10/18   1550   LDL   --    --   115*   HDL   --    --   63   TRIG   --    --   193*   ALT   --   63  102*   CR  0.97  1.03  1.09   GFRESTIMATED  89  83  77   GFRESTBLACK  >90  >90  >90   POTASSIUM  4.0  4.1  3.3*      BP Readings from Last 3 Encounters:   07/18/18 136/88   07/12/18 (!) 149/93   10/16/17 (!) 148/97    Wt Readings from Last 3 Encounters:   07/18/18 182 lb (82.6 kg)   07/10/18 185 lb (83.9 kg)   10/16/17 191 lb (86.6 kg)                  Labs reviewed in EPIC    Reviewed and updated as needed this visit by clinical staff       Reviewed and updated as needed this visit by Provider         ROS:  Constitutional, HEENT, cardiovascular, pulmonary, GI, , musculoskeletal, neuro, skin, endocrine and psych systems are negative, except as otherwise noted.    OBJECTIVE:     /88  Pulse 88  Temp 98.8  F (37.1  C) (Tympanic)  Ht 6' 1\" (1.854 m)  Wt 182 lb (82.6 kg)  SpO2 98%  BMI 24.01 kg/m2  Body mass index is 24.01 kg/(m^2).     BP Readings from Last 6 Encounters:   07/18/18 136/88   07/12/18 (!) 149/93   10/16/17 " (!) 148/97   01/27/17 (!) 169/101         GENERAL: healthy, alert and no distress  RESP: lungs clear to auscultation - no rales, rhonchi or wheezes  CV: regular rate and rhythm, normal S1 S2, no S3 or S4, no murmur, click or rub, no peripheral edema and peripheral pulses strong  ABDOMEN: soft, nontender, no hepatosplenomegaly, no masses and bowel sounds normal  MS: no gross musculoskeletal defects noted, no edema  SKIN: no suspicious lesions or rashes  NEURO: Normal strength and tone, mentation intact and speech normal  PSYCH: mentation appears normal, affect normal/bright    Diagnostic Test Results:  Results for orders placed or performed in visit on 07/18/18 (from the past 24 hour(s))   CBC with platelets differential   Result Value Ref Range    WBC 10.3 4.0 - 11.0 10e9/L    RBC Count 4.53 4.4 - 5.9 10e12/L    Hemoglobin 14.4 13.3 - 17.7 g/dL    Hematocrit 42.4 40.0 - 53.0 %    MCV 94 78 - 100 fl    MCH 31.8 26.5 - 33.0 pg    MCHC 34.0 31.5 - 36.5 g/dL    RDW 12.2 10.0 - 15.0 %    Platelet Count 408 150 - 450 10e9/L    Diff Method Automated Method     % Neutrophils 61.4 %    % Lymphocytes 21.2 %    % Monocytes 11.2 %    % Eosinophils 5.1 %    % Basophils 1.1 %    Absolute Neutrophil 6.3 1.6 - 8.3 10e9/L    Absolute Lymphocytes 2.2 0.8 - 5.3 10e9/L    Absolute Monocytes 1.2 0.0 - 1.3 10e9/L    Absolute Eosinophils 0.5 0.0 - 0.7 10e9/L    Absolute Basophils 0.1 0.0 - 0.2 10e9/L   Uric acid   Result Value Ref Range    Uric Acid 9.3 (H) 3.5 - 7.2 mg/dL   TSH   Result Value Ref Range    TSH 3.10 0.40 - 4.00 mU/L   CRP inflammation   Result Value Ref Range    CRP Inflammation 39.3 (H) 0.0 - 8.0 mg/L   Basic metabolic panel   Result Value Ref Range    Sodium 138 133 - 144 mmol/L    Potassium 4.0 3.4 - 5.3 mmol/L    Chloride 106 94 - 109 mmol/L    Carbon Dioxide 25 20 - 32 mmol/L    Anion Gap 7 3 - 14 mmol/L    Glucose 92 70 - 99 mg/dL    Urea Nitrogen 11 7 - 30 mg/dL    Creatinine 1.03 0.66 - 1.25 mg/dL    GFR Estimate  83 >60 mL/min/1.7m2    GFR Estimate If Black >90 >60 mL/min/1.7m2    Calcium 9.1 8.5 - 10.1 mg/dL   Hepatic panel   Result Value Ref Range    Bilirubin Direct 0.2 0.0 - 0.2 mg/dL    Bilirubin Total 0.6 0.2 - 1.3 mg/dL    Albumin 3.2 (L) 3.4 - 5.0 g/dL    Protein Total 8.0 6.8 - 8.8 g/dL    Alkaline Phosphatase 193 (H) 40 - 150 U/L    ALT 68 0 - 70 U/L    AST 36 0 - 45 U/L       ASSESSMENT/PLAN:     1. Acute pancreatitis without infection or necrosis, unspecified pancreatitis type  Improving symptoms.  Afebrile and pain free.  Discussed prevention   Discussed abstaining from alcohol  Reports drinking 2-3 drinks per day - denies ETOH abuse but does have history documented in chart with gout episodes in the past.  Uric acid levels also elevated, presence of fatty liver on US and Alk phos elevated.      Will monitor labs and recheck.    - CBC with platelets differential  - Uric acid  - TSH  - CRP inflammation  - Tissue transglutaminase tommie IgA and IgG  - Basic metabolic panel  - Hepatic panel  - US Abdomen Limited; Future    2. Benign essential hypertension   Controlled on Beta blocker - continue this and follow up     - CBC with platelets differential  - Uric acid  - TSH  - CRP inflammation  - Tissue transglutaminase tommie IgA and IgG  - Basic metabolic panel  - Hepatic panel  - US Abdomen Limited; Future    3. Acute idiopathic gout of multiple sites   Uric acid levels moderately elevated at 9.3.  Has had gout in knee and ankle in past.  Discussed prevention and treatment.  Will recheck after abstaining from alcohol in 1-2 months.    - CBC with platelets differential  - Uric acid  - TSH  - CRP inflammation  - Tissue transglutaminase tommie IgA and IgG  - Basic metabolic panel  - Hepatic panel  - US Abdomen Limited; Future    4. Fatty liver   Discussed and given information.  No family history.  Trigs mildly elevated     - CBC with platelets differential  - Uric acid  - TSH  - CRP inflammation  - Tissue transglutaminase tommie  IgA and IgG  - Basic metabolic panel  - Hepatic panel  - US Abdomen Limited; Future    There are no Patient Instructions on file for this visit.    Chelle Rashid NP    Total times spent with patient 40 minutes of which > 50% of the time was spent counseling and coordination of care review of hospitalization, medications, reviewed SE, risks, discussed pancreatitis, fatty liver, diet, alcohol use/abstainance, follow up and recommendations.    [facility]

## 2018-07-18 NOTE — MR AVS SNAPSHOT
"              After Visit Summary   7/18/2018    Ed Mckenzie    MRN: [id number]           Patient Information     Date Of Birth          1985        Visit Information        Provider Department      7/18/2018 8:40 AM Chelle Rashid NP [facility]        Today's Diagnoses     Acute idiopathic gout of multiple sites    -  1    Acute pancreatitis without infection or necrosis, unspecified pancreatitis type        Benign essential hypertension        Fatty liver           Follow-ups after your visit        Future tests that were ordered for you today     Open Future Orders        Priority Expected Expires Ordered    US Abdomen Limited Routine 1/18/2019 7/18/2019 7/18/2018            Who to contact     If you have questions or need follow up information about today's clinic visit or your schedule please contact [facility] directly at [phone].  Normal or non-critical lab and imaging results will be communicated to you by MyChart, letter or phone within 4 business days after the clinic has received the results. If you do not hear from us within 7 days, please contact the clinic through MyChart or phone. If you have a critical or abnormal lab result, we will notify you by phone as soon as possible.  Submit refill requests through [facility] or call your pharmacy and they will forward the refill request to us. Please allow 3 business days for your refill to be completed.          Additional Information About Your Visit        [employer] Information     [facility] lets you send messages to your doctor, view your test results, renew your prescriptions, schedule appointments and more. To sign up, go to [email or web address] . Click on \"Log in\" on the left side of the screen, which will take you to the Welcome page. Then click on \"Sign up Now\" on the right side of the page.     You will be asked to enter the access code listed below, as well as some personal information. Please follow " "the directions to create your username and password.     Your access code is: [email or web address]  Expires: 10/8/2018  3:40 PM     Your access code will  in 90 days. If you need help or a new code, please call your [place] clinic or [phone].        Care EveryWhere ID     This is your Care EveryWhere ID. This could be used by other organizations to access your [place] medical records  [id number]        Your Vitals Were     Pulse Temperature Height Pulse Oximetry BMI (Body Mass Index)       88 98.8  F (37.1  C) (Tympanic) 6' 1\" (1.854 m) 98% 24.01 kg/m2        Blood Pressure from Last 3 Encounters:   18 136/88   18 (!) 149/93   10/16/17 (!) 148/97    Weight from Last 3 Encounters:   18 182 lb (82.6 kg)   07/10/18 185 lb (83.9 kg)   10/16/17 191 lb (86.6 kg)              We Performed the Following     Basic metabolic panel     CBC with platelets differential     CRP inflammation     Hepatic panel     Tissue transglutaminase tommie IgA and IgG     TSH     Uric acid        Primary Care Provider Office Phone # Fax #    Chelle Gretchen Rashid [phone] [address] [facility] [address]        Equal Access to Services     PEGGY LEE : Hadii aad ku hadasho Soomaali, waaxda luqadaha, qaybta kaalmada adeegyada, yinka watson. So [facility] [phone].    ATENCIÓN: Si habla español, tiene a aragon disposición servicios gratuitos de asistencia lingüística. Llame al [phone].    We comply with applicable federal civil rights laws and [place] laws. We do not discriminate on the basis of race, color, national origin, age, disability, sex, sexual orientation, or gender identity.            Thank you!     Thank you for choosing [facility]  for your care. Our goal is always to provide you with excellent care. Hearing back from our patients is one way we can continue to improve our services. Please take a few minutes to complete the written " survey that you may receive in the mail after your visit with us. Thank you!             Your Updated Medication List - Protect others around you: Learn how to safely use, store and throw away your medicines at www.disposemymeds.org.          This list is accurate as of 7/18/18 12:45 PM.  Always use your most recent med list.                   Brand Name Dispense Instructions for use Diagnosis    ibuprofen 200 MG capsule      Take 800 mg by mouth every 8 hours as needed for fever        metoprolol succinate 50 MG 24 hr tablet    TOPROL-XL    90 tablet    Take 1 tablet (50 mg) by mouth daily    Essential hypertension       ONE-A-DAY MENS PO      Take 2 chew tab by mouth daily        PRILOSEC OTC PO      Take 1 tablet by mouth daily

## 2018-07-19 LAB
TTG IGA SER-ACNC: <1 U/ML
TTG IGG SER-ACNC: <1 U/ML

## 2018-11-27 ENCOUNTER — HOSPITAL ENCOUNTER (OUTPATIENT)
Facility: CLINIC | Age: 33
Setting detail: OBSERVATION
Discharge: HOME | End: 2018-11-28
Attending: EMERGENCY MEDICINE | Admitting: EMERGENCY MEDICINE
Payer: COMMERCIAL

## 2018-11-27 DIAGNOSIS — K85.20 ALCOHOL-INDUCED ACUTE PANCREATITIS WITHOUT INFECTION OR NECROSIS: ICD-10-CM

## 2018-11-27 DIAGNOSIS — F10.10 ALCOHOL ABUSE: ICD-10-CM

## 2018-11-27 PROBLEM — F17.200 TOBACCO DEPENDENCE SYNDROME: Status: ACTIVE | Noted: 2018-11-27

## 2018-11-27 PROBLEM — Z87.39 HISTORY OF GOUT: Status: ACTIVE | Noted: 2018-11-27

## 2018-11-27 PROBLEM — K85.90 ACUTE PANCREATITIS WITHOUT INFECTION OR NECROSIS: Status: ACTIVE | Noted: 2018-11-27

## 2018-11-27 PROBLEM — K85.90 PANCREATITIS: Status: RESOLVED | Noted: 2018-07-10 | Resolved: 2018-11-27

## 2018-11-27 PROBLEM — K85.90 ACUTE PANCREATITIS WITHOUT INFECTION OR NECROSIS: Status: RESOLVED | Noted: 2018-07-10 | Resolved: 2018-11-27

## 2018-11-27 PROBLEM — K85.90 PANCREATITIS: Status: ACTIVE | Noted: 2018-11-27

## 2018-11-27 PROBLEM — K21.00 GASTROESOPHAGEAL REFLUX DISEASE WITH ESOPHAGITIS: Status: ACTIVE | Noted: 2018-11-27

## 2018-11-27 PROBLEM — M10.09 ACUTE IDIOPATHIC GOUT OF MULTIPLE SITES: Status: RESOLVED | Noted: 2017-10-16 | Resolved: 2018-11-27

## 2018-11-27 LAB
ALBUMIN SERPL-MCNC: 4.2 G/DL (ref 3.4–5)
ALP SERPL-CCNC: 190 U/L (ref 40–150)
ALT SERPL W P-5'-P-CCNC: 56 U/L (ref 0–70)
ANION GAP SERPL CALCULATED.3IONS-SCNC: 14 MMOL/L (ref 3–14)
AST SERPL W P-5'-P-CCNC: 43 U/L (ref 0–45)
BASOPHILS # BLD AUTO: 0.1 10E9/L (ref 0–0.2)
BASOPHILS NFR BLD AUTO: 0.6 %
BILIRUB SERPL-MCNC: 1.3 MG/DL (ref 0.2–1.3)
BUN SERPL-MCNC: 10 MG/DL (ref 7–30)
CALCIUM SERPL-MCNC: 9.5 MG/DL (ref 8.5–10.1)
CHLORIDE SERPL-SCNC: 95 MMOL/L (ref 94–109)
CO2 SERPL-SCNC: 22 MMOL/L (ref 20–32)
CREAT SERPL-MCNC: 1.07 MG/DL (ref 0.66–1.25)
DIFFERENTIAL METHOD BLD: ABNORMAL
EOSINOPHIL # BLD AUTO: 0.1 10E9/L (ref 0–0.7)
EOSINOPHIL NFR BLD AUTO: 0.5 %
ERYTHROCYTE [DISTWIDTH] IN BLOOD BY AUTOMATED COUNT: 11.9 % (ref 10–15)
GFR SERPL CREATININE-BSD FRML MDRD: 79 ML/MIN/1.7M2
GLUCOSE SERPL-MCNC: 125 MG/DL (ref 70–99)
HCT VFR BLD AUTO: 48.7 % (ref 40–53)
HGB BLD-MCNC: 17.5 G/DL (ref 13.3–17.7)
IMM GRANULOCYTES # BLD: 0 10E9/L (ref 0–0.4)
IMM GRANULOCYTES NFR BLD: 0.4 %
LIPASE SERPL-CCNC: 2444 U/L (ref 73–393)
LYMPHOCYTES # BLD AUTO: 1 10E9/L (ref 0.8–5.3)
LYMPHOCYTES NFR BLD AUTO: 9.3 %
MCH RBC QN AUTO: 31.4 PG (ref 26.5–33)
MCHC RBC AUTO-ENTMCNC: 35.9 G/DL (ref 31.5–36.5)
MCV RBC AUTO: 87 FL (ref 78–100)
MONOCYTES # BLD AUTO: 0.7 10E9/L (ref 0–1.3)
MONOCYTES NFR BLD AUTO: 6.7 %
NEUTROPHILS # BLD AUTO: 9.1 10E9/L (ref 1.6–8.3)
NEUTROPHILS NFR BLD AUTO: 82.5 %
NRBC # BLD AUTO: 0 10*3/UL
NRBC BLD AUTO-RTO: 0 /100
PLATELET # BLD AUTO: 279 10E9/L (ref 150–450)
POTASSIUM SERPL-SCNC: 3.6 MMOL/L (ref 3.4–5.3)
PROT SERPL-MCNC: 8.3 G/DL (ref 6.8–8.8)
RBC # BLD AUTO: 5.58 10E12/L (ref 4.4–5.9)
SODIUM SERPL-SCNC: 131 MMOL/L (ref 133–144)
WBC # BLD AUTO: 11 10E9/L (ref 4–11)

## 2018-11-27 PROCEDURE — 80053 COMPREHEN METABOLIC PANEL: CPT | Performed by: EMERGENCY MEDICINE

## 2018-11-27 PROCEDURE — 25000132 ZZH RX MED GY IP 250 OP 250 PS 637: Performed by: EMERGENCY MEDICINE

## 2018-11-27 PROCEDURE — 25000128 H RX IP 250 OP 636: Performed by: EMERGENCY MEDICINE

## 2018-11-27 PROCEDURE — 99285 EMERGENCY DEPT VISIT HI MDM: CPT | Mod: 25 | Performed by: EMERGENCY MEDICINE

## 2018-11-27 PROCEDURE — 99207 ZZC CDG-CODE CATEGORY CHANGED: CPT | Performed by: PHYSICIAN ASSISTANT

## 2018-11-27 PROCEDURE — 76705 ECHO EXAM OF ABDOMEN: CPT | Performed by: EMERGENCY MEDICINE

## 2018-11-27 PROCEDURE — 99220 ZZC INITIAL OBSERVATION CARE,LEVL III: CPT | Performed by: PHYSICIAN ASSISTANT

## 2018-11-27 PROCEDURE — 76705 ECHO EXAM OF ABDOMEN: CPT | Mod: 26 | Performed by: EMERGENCY MEDICINE

## 2018-11-27 PROCEDURE — 96374 THER/PROPH/DIAG INJ IV PUSH: CPT | Performed by: EMERGENCY MEDICINE

## 2018-11-27 PROCEDURE — 96376 TX/PRO/DX INJ SAME DRUG ADON: CPT

## 2018-11-27 PROCEDURE — G0378 HOSPITAL OBSERVATION PER HR: HCPCS

## 2018-11-27 PROCEDURE — 96376 TX/PRO/DX INJ SAME DRUG ADON: CPT | Performed by: EMERGENCY MEDICINE

## 2018-11-27 PROCEDURE — 83690 ASSAY OF LIPASE: CPT | Performed by: EMERGENCY MEDICINE

## 2018-11-27 PROCEDURE — 85025 COMPLETE CBC W/AUTO DIFF WBC: CPT | Performed by: EMERGENCY MEDICINE

## 2018-11-27 PROCEDURE — 96361 HYDRATE IV INFUSION ADD-ON: CPT | Performed by: EMERGENCY MEDICINE

## 2018-11-27 PROCEDURE — 25000128 H RX IP 250 OP 636: Performed by: PHYSICIAN ASSISTANT

## 2018-11-27 RX ORDER — ACETAMINOPHEN 650 MG/1
650 SUPPOSITORY RECTAL EVERY 4 HOURS PRN
Status: DISCONTINUED | OUTPATIENT
Start: 2018-11-27 | End: 2018-11-28 | Stop reason: HOSPADM

## 2018-11-27 RX ORDER — CLONIDINE HYDROCHLORIDE 0.1 MG/1
0.1 TABLET ORAL ONCE
Status: COMPLETED | OUTPATIENT
Start: 2018-11-27 | End: 2018-11-27

## 2018-11-27 RX ORDER — ONDANSETRON 2 MG/ML
4 INJECTION INTRAMUSCULAR; INTRAVENOUS EVERY 6 HOURS PRN
Status: DISCONTINUED | OUTPATIENT
Start: 2018-11-27 | End: 2018-11-28 | Stop reason: HOSPADM

## 2018-11-27 RX ORDER — HYDRALAZINE HYDROCHLORIDE 20 MG/ML
10 INJECTION INTRAMUSCULAR; INTRAVENOUS EVERY 6 HOURS PRN
Status: DISCONTINUED | OUTPATIENT
Start: 2018-11-27 | End: 2018-11-28 | Stop reason: HOSPADM

## 2018-11-27 RX ORDER — HYDROMORPHONE HYDROCHLORIDE 1 MG/ML
.3-.5 INJECTION, SOLUTION INTRAMUSCULAR; INTRAVENOUS; SUBCUTANEOUS
Status: DISCONTINUED | OUTPATIENT
Start: 2018-11-27 | End: 2018-11-28 | Stop reason: HOSPADM

## 2018-11-27 RX ORDER — LORAZEPAM 1 MG/1
1-2 TABLET ORAL EVERY 30 MIN PRN
Status: DISCONTINUED | OUTPATIENT
Start: 2018-11-27 | End: 2018-11-28 | Stop reason: HOSPADM

## 2018-11-27 RX ORDER — ONDANSETRON 2 MG/ML
4 INJECTION INTRAMUSCULAR; INTRAVENOUS ONCE
Status: DISCONTINUED | OUTPATIENT
Start: 2018-11-27 | End: 2018-11-27

## 2018-11-27 RX ORDER — SODIUM CHLORIDE, SODIUM LACTATE, POTASSIUM CHLORIDE, CALCIUM CHLORIDE 600; 310; 30; 20 MG/100ML; MG/100ML; MG/100ML; MG/100ML
INJECTION, SOLUTION INTRAVENOUS CONTINUOUS
Status: DISCONTINUED | OUTPATIENT
Start: 2018-11-27 | End: 2018-11-28 | Stop reason: HOSPADM

## 2018-11-27 RX ORDER — ACETAMINOPHEN 325 MG/1
650 TABLET ORAL EVERY 4 HOURS PRN
Status: DISCONTINUED | OUTPATIENT
Start: 2018-11-27 | End: 2018-11-28 | Stop reason: HOSPADM

## 2018-11-27 RX ORDER — LORAZEPAM 2 MG/ML
1-2 INJECTION INTRAMUSCULAR EVERY 30 MIN PRN
Status: DISCONTINUED | OUTPATIENT
Start: 2018-11-27 | End: 2018-11-28 | Stop reason: HOSPADM

## 2018-11-27 RX ORDER — PROCHLORPERAZINE MALEATE 10 MG
10 TABLET ORAL EVERY 6 HOURS PRN
Status: DISCONTINUED | OUTPATIENT
Start: 2018-11-27 | End: 2018-11-28 | Stop reason: HOSPADM

## 2018-11-27 RX ORDER — ONDANSETRON 4 MG/1
4 TABLET, ORALLY DISINTEGRATING ORAL EVERY 6 HOURS PRN
Status: DISCONTINUED | OUTPATIENT
Start: 2018-11-27 | End: 2018-11-28 | Stop reason: HOSPADM

## 2018-11-27 RX ORDER — OXYCODONE HYDROCHLORIDE 5 MG/1
5 TABLET ORAL EVERY 6 HOURS PRN
Qty: 12 TABLET | Refills: 0 | Status: SHIPPED | OUTPATIENT
Start: 2018-11-27 | End: 2019-09-25

## 2018-11-27 RX ORDER — OXYCODONE HYDROCHLORIDE 5 MG/1
10 TABLET ORAL ONCE
Status: COMPLETED | OUTPATIENT
Start: 2018-11-27 | End: 2018-11-27

## 2018-11-27 RX ORDER — HYDROMORPHONE HYDROCHLORIDE 1 MG/ML
0.5 INJECTION, SOLUTION INTRAMUSCULAR; INTRAVENOUS; SUBCUTANEOUS ONCE
Status: COMPLETED | OUTPATIENT
Start: 2018-11-27 | End: 2018-11-27

## 2018-11-27 RX ORDER — PROCHLORPERAZINE 25 MG
25 SUPPOSITORY, RECTAL RECTAL EVERY 12 HOURS PRN
Status: DISCONTINUED | OUTPATIENT
Start: 2018-11-27 | End: 2018-11-28 | Stop reason: HOSPADM

## 2018-11-27 RX ORDER — ONDANSETRON 4 MG/1
4 TABLET, ORALLY DISINTEGRATING ORAL EVERY 8 HOURS PRN
Qty: 10 TABLET | Refills: 0 | Status: SHIPPED | OUTPATIENT
Start: 2018-11-27 | End: 2018-11-30

## 2018-11-27 RX ORDER — OXYCODONE HYDROCHLORIDE 5 MG/1
5-10 TABLET ORAL
Status: DISCONTINUED | OUTPATIENT
Start: 2018-11-27 | End: 2018-11-28 | Stop reason: HOSPADM

## 2018-11-27 RX ORDER — NALOXONE HYDROCHLORIDE 0.4 MG/ML
.1-.4 INJECTION, SOLUTION INTRAMUSCULAR; INTRAVENOUS; SUBCUTANEOUS
Status: DISCONTINUED | OUTPATIENT
Start: 2018-11-27 | End: 2018-11-28 | Stop reason: HOSPADM

## 2018-11-27 RX ORDER — METOPROLOL SUCCINATE 50 MG/1
50 TABLET, EXTENDED RELEASE ORAL DAILY
Status: DISCONTINUED | OUTPATIENT
Start: 2018-11-28 | End: 2018-11-28 | Stop reason: HOSPADM

## 2018-11-27 RX ORDER — HYDROMORPHONE HYDROCHLORIDE 1 MG/ML
0.5 INJECTION, SOLUTION INTRAMUSCULAR; INTRAVENOUS; SUBCUTANEOUS
Status: COMPLETED | OUTPATIENT
Start: 2018-11-27 | End: 2018-11-27

## 2018-11-27 RX ADMIN — SODIUM CHLORIDE, POTASSIUM CHLORIDE, SODIUM LACTATE AND CALCIUM CHLORIDE 1000 ML: 600; 310; 30; 20 INJECTION, SOLUTION INTRAVENOUS at 22:18

## 2018-11-27 RX ADMIN — CLONIDINE HYDROCHLORIDE 0.1 MG: 0.1 TABLET ORAL at 19:11

## 2018-11-27 RX ADMIN — SODIUM CHLORIDE, POTASSIUM CHLORIDE, SODIUM LACTATE AND CALCIUM CHLORIDE 1000 ML: 600; 310; 30; 20 INJECTION, SOLUTION INTRAVENOUS at 16:19

## 2018-11-27 RX ADMIN — SODIUM CHLORIDE, POTASSIUM CHLORIDE, SODIUM LACTATE AND CALCIUM CHLORIDE 1000 ML: 600; 310; 30; 20 INJECTION, SOLUTION INTRAVENOUS at 17:39

## 2018-11-27 RX ADMIN — SODIUM CHLORIDE, POTASSIUM CHLORIDE, SODIUM LACTATE AND CALCIUM CHLORIDE: 600; 310; 30; 20 INJECTION, SOLUTION INTRAVENOUS at 22:59

## 2018-11-27 RX ADMIN — CLONIDINE HYDROCHLORIDE 0.1 MG: 0.1 TABLET ORAL at 20:36

## 2018-11-27 RX ADMIN — HYDROMORPHONE HYDROCHLORIDE 0.5 MG: 1 INJECTION, SOLUTION INTRAMUSCULAR; INTRAVENOUS; SUBCUTANEOUS at 23:03

## 2018-11-27 RX ADMIN — Medication 0.5 MG: at 16:19

## 2018-11-27 RX ADMIN — RANITIDINE 300 MG: 150 TABLET ORAL at 16:19

## 2018-11-27 RX ADMIN — Medication 0.5 MG: at 18:02

## 2018-11-27 RX ADMIN — Medication 0.5 MG: at 19:13

## 2018-11-27 RX ADMIN — OXYCODONE HYDROCHLORIDE 10 MG: 5 TABLET ORAL at 17:38

## 2018-11-27 RX ADMIN — Medication 0.5 MG: at 21:18

## 2018-11-27 ASSESSMENT — PAIN DESCRIPTION - DESCRIPTORS
DESCRIPTORS: ACHING
DESCRIPTORS: PRESSURE;SHARP
DESCRIPTORS: ACHING

## 2018-11-27 NOTE — ED PROVIDER NOTES
History     Chief Complaint   Patient presents with     Abdominal Pain     sob     HPI  Ed Mckenzie is a 33 year old male who presents for abdominal pain.  Symptoms started several hours prior to arrival.  Pain is epigastric, sharp, rated as moderate to severe.  Pain radiates around to the right upper quadrant.  No nausea or vomiting.  He says this feels similar to when he has had pancreatitis in the past.  He has been drinking water.  No fevers or chills.  No prior abdominal surgeries.  He has not taking medication for this.  Denies dysuria, urinary frequency, or rash.  No difficulty breathing or cough.  He says he did drink more alcohol over the holiday weekend then he has since his first diagnosis of pancreatitis.    Problem List:    Patient Active Problem List    Diagnosis Date Noted     Benign essential hypertension 07/18/2018     Priority: Medium     Acute pancreatitis without infection or necrosis 07/10/2018     Priority: Medium     Pancreatitis 07/10/2018     Priority: Medium     Acute idiopathic gout of multiple sites 10/16/2017     Priority: Medium        Past Medical History:    No past medical history on file.    Past Surgical History:    No past surgical history on file.    Family History:    Family History   Problem Relation Age of Onset     Breast Cancer Mother      Ovarian Cancer Mother 58       Social History:  Marital Status:  Single [1]  Social History   Substance Use Topics     Smoking status: Current Every Day Smoker     Packs/day: 0.50     Smokeless tobacco: Never Used      Comment: currently using lozenges     Alcohol use Yes      Comment: 2-3 cocktails per night        Medications:      metoprolol succinate (TOPROL-XL) 50 MG 24 hr tablet   Multiple Vitamin (ONE-A-DAY MENS PO)   Omeprazole Magnesium (PRILOSEC OTC PO)   ondansetron (ZOFRAN ODT) 4 MG ODT tab   oxyCODONE (ROXICODONE) 5 MG tablet   ibuprofen 200 MG capsule         Review of Systems  Pertinent positives and negatives listed  in the HPI, all other systems reviewed and are negative.    Physical Exam   BP: (!) 171/119  Pulse: 101  Heart Rate: 84  Temp: 97.1  F (36.2  C)  Resp: 18  Weight: 83.9 kg (185 lb)  SpO2: 98 %      Physical Exam   Constitutional: He is oriented to person, place, and time. He appears well-developed and well-nourished. He appears distressed.   HENT:   Head: Normocephalic and atraumatic.   Right Ear: External ear normal.   Left Ear: External ear normal.   Nose: Nose normal.   Eyes: Conjunctivae are normal. No scleral icterus.   Neck: Normal range of motion.   Cardiovascular: Normal rate and regular rhythm.    Pulmonary/Chest: Effort normal. No stridor. No respiratory distress.   Abdominal: Soft. He exhibits no distension. There is tenderness in the right upper quadrant, epigastric area and left upper quadrant. There is no rigidity and no guarding.   Neurological: He is alert and oriented to person, place, and time.   Skin: Skin is warm and dry. He is not diaphoretic.   Psychiatric: He has a normal mood and affect. His behavior is normal.   Nursing note and vitals reviewed.      ED Course     ED Course     Procedures    Results for orders placed during the hospital encounter of 11/27/18   POC US ABDOMEN LIMITED    [facility] Procedure Note      Limited Bedside ED Gallbladder  Ultrasound:    PROCEDURE: PERFORMED BY: Dr. Naeem Bhakta  INDICATIONS:  Abdominal Pain  PROBE:  Low frequency convex probe  BODY LOCATION: Abdomen  FINDINGS:   An ultrasound of the gallbladder was performed using longitudinal and transverse views.  Gallstone(s):  Absent  Gallbladder sludge:  Absent  Sonographic Schneider's sign:  Absent  Gallbladder wall thickening (greater than 4 mm):  Absent  Pericholecystic fluid: Absent  Common bile duct (dilated if internal diameter greater than 6 mm): Unable to visualize   INTERPRETATION:  The gallbladder evaluation is normal with no gallstones/sludge, no sonographic Schneider s sign, no  GB wall thickening, no pericholecystic fluid, and without evidence of cholelithiasis or cholecystitis.  IMAGE DOCUMENTATION: Images were archived to PACs system.              Critical Care time:  none               Results for orders placed or performed during the hospital encounter of 11/27/18 (from the past 24 hour(s))   POC US ABDOMEN LIMITED    [facility] Procedure Note      Limited Bedside ED Gallbladder  Ultrasound:    PROCEDURE: PERFORMED BY: Dr. Naeem Bhakta  INDICATIONS:  Abdominal Pain  PROBE:  Low frequency convex probe  BODY LOCATION: Abdomen  FINDINGS:   An ultrasound of the gallbladder was performed using longitudinal and transverse views.  Gallstone(s):  Absent  Gallbladder sludge:  Absent  Sonographic Schneider's sign:  Absent  Gallbladder wall thickening (greater than 4 mm):  Absent  Pericholecystic fluid: Absent  Common bile duct (dilated if internal diameter greater than 6 mm): Unable to visualize   INTERPRETATION:  The gallbladder evaluation is normal with no gallstones/sludge, no sonographic Schneider s sign, no GB wall thickening, no pericholecystic fluid, and without evidence of cholelithiasis or cholecystitis.  IMAGE DOCUMENTATION: Images were archived to PACs system.    CBC with platelets differential   Result Value Ref Range    WBC 11.0 4.0 - 11.0 10e9/L    RBC Count 5.58 4.4 - 5.9 10e12/L    Hemoglobin 17.5 13.3 - 17.7 g/dL    Hematocrit 48.7 40.0 - 53.0 %    MCV 87 78 - 100 fl    MCH 31.4 26.5 - 33.0 pg    MCHC 35.9 31.5 - 36.5 g/dL    RDW 11.9 10.0 - 15.0 %    Platelet Count 279 150 - 450 10e9/L    Diff Method Automated Method     % Neutrophils 82.5 %    % Lymphocytes 9.3 %    % Monocytes 6.7 %    % Eosinophils 0.5 %    % Basophils 0.6 %    % Immature Granulocytes 0.4 %    Nucleated RBCs 0 0 /100    Absolute Neutrophil 9.1 (H) 1.6 - 8.3 10e9/L    Absolute Lymphocytes 1.0 0.8 - 5.3 10e9/L    Absolute Monocytes 0.7 0.0 - 1.3 10e9/L    Absolute Eosinophils 0.1 0.0 -  0.7 10e9/L    Absolute Basophils 0.1 0.0 - 0.2 10e9/L    Abs Immature Granulocytes 0.0 0 - 0.4 10e9/L    Absolute Nucleated RBC 0.0    Comprehensive metabolic panel   Result Value Ref Range    Sodium 131 (L) 133 - 144 mmol/L    Potassium 3.6 3.4 - 5.3 mmol/L    Chloride 95 94 - 109 mmol/L    Carbon Dioxide 22 20 - 32 mmol/L    Anion Gap 14 3 - 14 mmol/L    Glucose 125 (H) 70 - 99 mg/dL    Urea Nitrogen 10 7 - 30 mg/dL    Creatinine 1.07 0.66 - 1.25 mg/dL    GFR Estimate 79 >60 mL/min/1.7m2    GFR Estimate If Black >90 >60 mL/min/1.7m2    Calcium 9.5 8.5 - 10.1 mg/dL    Bilirubin Total 1.3 0.2 - 1.3 mg/dL    Albumin 4.2 3.4 - 5.0 g/dL    Protein Total 8.3 6.8 - 8.8 g/dL    Alkaline Phosphatase 190 (H) 40 - 150 U/L    ALT 56 0 - 70 U/L    AST 43 0 - 45 U/L   Lipase   Result Value Ref Range    Lipase 2444 (H) 73 - 393 U/L       Medications   HYDROmorphone (PF) (DILAUDID) injection 0.5 mg (0.5 mg Intravenous Given 11/27/18 1619)   ondansetron (ZOFRAN) injection 4 mg (not administered)   lactated ringers BOLUS 1,000 mL (1,000 mLs Intravenous New Bag 11/27/18 1739)   lactated ringers BOLUS 1,000 mL (0 mLs Intravenous Stopped 11/27/18 1739)   ranitidine (ZANTAC) tablet 300 mg (300 mg Oral Given 11/27/18 1619)   oxyCODONE (ROXICODONE) tablet 10 mg (10 mg Oral Given 11/27/18 1738)       Assessments & Plan (with Medical Decision Making)   33-year-old male presents with abdominal pain.  Temperature is 36.2 C, heart 101, SPO2 is 98% on room air.  Bedside ultrasound shows no signs of cholelithiasis or cholecystitis.  White blood cell count 11.  Hemoglobin is 17.5.  He is given IV fluids, IV hydromorphone, and oral ranitidine for symptoms.  Alkaline phosphatase mildly elevated at 190, stable from prior levels.  Otherwise LFTs normal.  Electrolytes are within normal limits.  Lipase is elevated 2444 consistent with acute pancreatitis, likely related to his alcohol use.  He is given additional IV fluids.  Recheck he is feeling  somewhat better.  He is given oral oxycodone and given food to eat.  He is signed out to Dr. Cantu at change of shift for reevaluation.  If he is able to tolerate oral intake and his symptoms are controlled he is safe to discharge with prescriptions for ondansetron and oxycodone instructions return if worse, otherwise follow-up in clinic.  If unable to tolerate orals require admission the hospital.    I have reviewed the nursing notes.    I have reviewed the findings, diagnosis, plan and need for follow up with the patient.       New Prescriptions    ONDANSETRON (ZOFRAN ODT) 4 MG ODT TAB    Take 1 tablet (4 mg) by mouth every 8 hours as needed    OXYCODONE (ROXICODONE) 5 MG TABLET    Take 1 tablet (5 mg) by mouth every 6 hours as needed for pain       Final diagnoses:   Alcohol-induced acute pancreatitis without infection or necrosis       11/27/2018   [facility] EMERGENCY DEPARTMENT     Naeem Bhakta MD  11/27/18 0798

## 2018-11-27 NOTE — LETTER
November 27, 2018      To Whom It May Concern:      Ed Alejandrokvng was seen in our Emergency Department today, 11/27/18.  Please excuse him from work on 11/28/18.    Sincerely,        Naeem Bhakta MD

## 2018-11-27 NOTE — IP AVS SNAPSHOT
MRN:[id number]                      After Visit Summary   11/27/2018    Ed Mckenzie    MRN: [id number]           Thank you!     Thank you for choosing [facility] for your care. Our goal is always to provide you with excellent care. Hearing back from our patients is one way we can continue to improve our services. Please take a few minutes to complete the written survey that you may receive in the mail after you visit with us. Thank you!        Patient Information     Date Of Birth          1985        Designated Caregiver       Most Recent Value    Caregiver    Will someone help with your care after discharge? no      About your hospital stay     You were admitted on:  November 27, 2018 You last received care in the:  [facility]    You were discharged on:  November 28, 2018       Who to Call     For medical emergencies, please call 911.  For non-urgent questions about your medical care, please call your primary care provider or clinic, [phone]          Attending Provider     Provider Specialty    Naeem Bhakta MD Emergency Medicine    Pepe, Juan C Hooks MD Emergency Medicine    Becerra, Estuardo Meadows MD Pulmonary    Sakina Wills MD Internal Medicine       Primary Care Provider Office Phone # Fax #    Chelle Rashid [phone]      After Care Instructions     Activity       Your activity upon discharge: activity as tolerated            Diet       Follow this diet upon discharge: Orders Placed This Encounter      Regular Diet Adult                  Follow-up Appointments     Follow-up and recommended labs and tests        Follow up with primary care provider, Chelle Rashid, within 1-2 weeks, for hospital follow- up.                  Your next 10 appointments already scheduled     Dec 06, 2018  1:40 PM CST   [facility]    [address]  [place]  [facility] [address]   [phone]           Bring a current list of meds and any records pertaining to this visit. For Physicals, please bring immunization records and any forms needing to be filled out. Please arrive 10 minutes early to complete paperwork.              Further instructions from your care team       Return to the emergency department if you have repeated vomiting, severe pain, fevers, or other concerns.  Otherwise follow-up in clinic for recheck.    Use ibuprofen and acetaminophen for your symptoms.  Use oxycodone as needed for pain that is not controlled by the prior two medications.    Oxycodone is an addictive opioid medication, please only take it when the pain is more than can be controlled by acetaminophen or ibuprofen alone. It will also make you lightheaded, nauseated, and constipated.  Do not drive, operate heavy machinery, or take care of young children while taking this medication.     Repeated studies have shown that the longer you use opioid pain medications, the longer it is until you return to normal function. It is our recommendation that you taper off opioids as quickly as possible with the goal of returning to normal function or near normal function. Long term use of opioids quickly results in growing tolerance to the medication (less of the benefits) and increased dependence (more of the bad side effects).     Pain is very difficult to treat and we can very rarely take away pain completely. If you are having difficulty with pain over several weeks after an injury, you may need to start different medications and therapies (such as physical therapy, graded exercise, massage, and acupuncture). Please talk about this with your regular doctor.     If you are interested in seeking free, confidential treatment referral and information service for individuals and families facing mental health and/or substance use disorders please call [phone])     Pending  "Results     No orders found for last 3 day(s).            Statement of Approval     Ordered          18 1245  I have reviewed and agree with all the recommendations and orders detailed in this document.  EFFECTIVE NOW     Approved and electronically signed by:  Sakina Wills MD             Admission Information     Date & Time Provider Department Dept. Phone    2018 Sakina Wills MD [facility] [phone]      Your Vitals Were     Blood Pressure Pulse Temperature Respirations Height Weight    151/98 (BP Location: Left arm) 77 98.6  F (37  C) (Oral) 18 1.854 m (6' 1\") 85.4 kg (188 lb 4.4 oz)    Pulse Oximetry BMI (Body Mass Index)                99% 24.84 kg/m2          MyChart Information     [employer] lets you send messages to your doctor, view your test results, renew your prescriptions, schedule appointments and more. To sign up, go to [email or web address] . Click on \"Log in\" on the left side of the screen, which will take you to the Welcome page. Then click on \"Sign up Now\" on the right side of the page.     You will be asked to enter the access code listed below, as well as some personal information. Please follow the directions to create your username and password.     Your access code is: [email or web address]  Expires: 2019 12:46 PM     Your access code will  in 90 days. If you need help or a new code, please call your [facility] clinic or [phone].        Care EveryWhere ID     This is your Care EveryWhere ID. This could be used by other organizations to access your [facility] medical records  [id number]        Equal Access to Services     [facility] ROSA : Hadsingh Mack, wabrenda kaia, qaybta bennyalyinka guillen. So [facility] [phone].    ATENCIÓN: Si habla español, tiene a aragon disposición servicios gratuitos de asistencia lingüística. Llame al [phone].    We comply with applicable federal civil rights laws and " [place] laws. We do not discriminate on the basis of race, color, national origin, age, disability, sex, sexual orientation, or gender identity.               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 Read the directions carefully, and ask your doctor or other care provider to review them with you.      CONTINUE these medicines which have NOT CHANGED        Dose / Directions    ibuprofen 200 MG capsule   Commonly known as:  ADVIL/MOTRIN        Dose:  800 mg   Take 800 mg by mouth every 8 hours as needed for fever   Refills:  0       metoprolol succinate 50 MG 24 hr tablet   Commonly known as:  TOPROL-XL   Used for:  Essential hypertension        Dose:  50 mg   Take 1 tablet (50 mg) by mouth daily   Quantity:  90 tablet   Refills:  1       ONE-A-DAY MENS PO        Dose:  2 chew tab   Take 2 chew tab by mouth daily   Refills:  0       PRILOSEC OTC PO        Dose:  1 tablet   Take 1 tablet by mouth daily   Refills:  0            Where to get your medicines      These medications were sent to [facility] [address] [place] [address] [address]     Phone:  [phone]     ondansetron 4 MG ODT tab         Some of these will need a  paper prescription and others can be bought over the counter. Ask your nurse if you have questions.     Bring a paper prescription for each of these medications     oxyCODONE 5 MG tablet    oxyCODONE 5 MG tablet                Protect others around you: Learn how to safely use, store and throw away your medicines at www.disposemymeds.org.        Information about OPIOIDS     PRESCRIPTION OPIOIDS: WHAT YOU NEED TO KNOW   We gave you an opioid (narcotic) pain medicine. It is important to manage your pain, but opioids are not always the best choice. You should first try all the other options your care team gave you. Take this medicine for as short a time (and as few doses) as possible.    Some activities can increase your pain, such as bandage changes or therapy sessions. It may help to take your pain medicine 30 to 60 minutes before these activities. Reduce your stress by getting enough sleep, working on hobbies you enjoy and practicing relaxation or meditation. Talk to your care team about ways to manage your pain beyond prescription opioids.    These medicines have risks:    DO NOT drive when on new or higher doses of pain medicine. These medicines can affect your alertness and reaction times, and you could be arrested for driving under the influence (DUI). If you need to use opioids long-term, talk to your care team about driving.    DO NOT operate heavy machinery    DO NOT do any other dangerous activities while taking these medicines.    DO NOT drink any alcohol while taking these medicines.     If the opioid prescribed includes acetaminophen, DO NOT take with any other medicines that contain acetaminophen. Read all labels carefully. Look for the word  acetaminophen  or  Tylenol.  Ask your pharmacist if you have questions or are unsure.    You can get addicted to pain medicines, especially if you have a history of addiction (chemical, alcohol or substance dependence). Talk to your care team about ways to reduce  this risk.    All opioids tend to cause constipation. Drink plenty of water and eat foods that have a lot of fiber, such as fruits, vegetables, prune juice, apple juice and high-fiber cereal. Take a laxative (Miralax, milk of magnesia, Colace, Senna) if you don t move your bowels at least every other day. Other side effects include upset stomach, sleepiness, dizziness, throwing up, tolerance (needing more of the medicine to have the same effect), physical dependence and slowed breathing.    Store your pills in a secure place, locked if possible. We will not replace any lost or stolen medicine. If you don t finish your medicine, please throw away (dispose) as directed by your pharmacist. The [place] [employer]ution [employer] has more information about safe disposal: https://www.pca.[facility]mn.us/living-green/managing-unwanted-medications             Medication List: This is a list of all your medications and when to take them. Check marks below indicate your daily home schedule. Keep this list as a reference.      Medications           Morning Afternoon Evening Bedtime As Needed    ibuprofen 200 MG capsule   Commonly known as:  ADVIL/MOTRIN   Take 800 mg by mouth every 8 hours as needed for fever   Last time this was given:  None given                                   metoprolol succinate 50 MG 24 hr tablet   Commonly known as:  TOPROL-XL   Take 1 tablet (50 mg) by mouth daily   Last time this was given:  50 mg on 11/28/2018  7:54 AM                                   ondansetron 4 MG ODT tab   Commonly known as:  ZOFRAN ODT   Take 1 tablet (4 mg) by mouth every 8 hours as needed                                   ONE-A-DAY MENS PO   Take 2 chew tab by mouth daily   Last time this was given:  None given                                   * oxyCODONE 5 MG tablet   Commonly known as:  ROXICODONE   Take 1 tablet (5 mg) by mouth every 6 hours as needed for pain   Last time this was given:  5 mg on 11/28/2018 11:10 AM                                    * oxyCODONE 5 MG tablet   Commonly known as:  ROXICODONE   Take 1-2 tablets (5-10 mg) by mouth every 3 hours as needed   Last time this was given:  5 mg on 11/28/2018 11:10 AM                                   PRILOSEC OTC PO   Take 1 tablet by mouth daily   Last time this was given:  None given                                    * Notice:  This list has 2 medication(s) that are the same as other medications prescribed for you. Read the directions carefully, and ask your doctor or other care provider to review them with you.

## 2018-11-27 NOTE — IP AVS SNAPSHOT
[facility]    [address] [facility] [address]    Phone:  [phone]    Fax:  [phone]                                       After Visit Summary   11/27/2018    Ed Mckenzie    MRN: [id number]           After Visit Summary Signature Page     I have received my discharge instructions, and my questions have been answered. I have discussed any challenges I see with this plan with the nurse or doctor.    ..........................................................................................................................................  Patient/Patient Representative Signature      ..........................................................................................................................................  Patient Representative Print Name and Relationship to Patient    ..................................................               ................................................  Date                                   Time    ..........................................................................................................................................  Reviewed by Signature/Title    ...................................................              ..............................................  Date                                               Time          22EPIC Rev 08/18

## 2018-11-27 NOTE — ED NOTES
Ed Mckenzie is  A 33 year old male who presents to the ED with complaints of ABD pain. PT is sitting on gurney in position of comfort. States beginning at 11am started having ABD with nausea, no vomiting or diarrhea. PT states pain is in the epigastric region and radiates to the right upper side. PT is noted to be A&OX4, appears to  Be in mild pain. MD at bedside and now awaiting orders..

## 2018-11-28 VITALS
SYSTOLIC BLOOD PRESSURE: 151 MMHG | OXYGEN SATURATION: 99 % | HEART RATE: 77 BPM | BODY MASS INDEX: 24.95 KG/M2 | HEIGHT: 73 IN | TEMPERATURE: 98.6 F | WEIGHT: 188.27 LBS | DIASTOLIC BLOOD PRESSURE: 98 MMHG | RESPIRATION RATE: 18 BRPM

## 2018-11-28 LAB
ALBUMIN SERPL-MCNC: 2.9 G/DL (ref 3.4–5)
ALP SERPL-CCNC: 122 U/L (ref 40–150)
ALT SERPL W P-5'-P-CCNC: 32 U/L (ref 0–70)
ANION GAP SERPL CALCULATED.3IONS-SCNC: 7 MMOL/L (ref 3–14)
AST SERPL W P-5'-P-CCNC: 29 U/L (ref 0–45)
BASOPHILS # BLD AUTO: 0.1 10E9/L (ref 0–0.2)
BASOPHILS NFR BLD AUTO: 0.7 %
BILIRUB SERPL-MCNC: 1.3 MG/DL (ref 0.2–1.3)
BUN SERPL-MCNC: 7 MG/DL (ref 7–30)
CALCIUM SERPL-MCNC: 8 MG/DL (ref 8.5–10.1)
CHLORIDE SERPL-SCNC: 102 MMOL/L (ref 94–109)
CO2 SERPL-SCNC: 29 MMOL/L (ref 20–32)
CREAT SERPL-MCNC: 0.85 MG/DL (ref 0.66–1.25)
DIFFERENTIAL METHOD BLD: NORMAL
EOSINOPHIL # BLD AUTO: 0.1 10E9/L (ref 0–0.7)
EOSINOPHIL NFR BLD AUTO: 1.3 %
ERYTHROCYTE [DISTWIDTH] IN BLOOD BY AUTOMATED COUNT: 12.1 % (ref 10–15)
GFR SERPL CREATININE-BSD FRML MDRD: >90 ML/MIN/1.7M2
GLUCOSE SERPL-MCNC: 98 MG/DL (ref 70–99)
HCT VFR BLD AUTO: 41.6 % (ref 40–53)
HGB BLD-MCNC: 14.6 G/DL (ref 13.3–17.7)
IMM GRANULOCYTES # BLD: 0 10E9/L (ref 0–0.4)
IMM GRANULOCYTES NFR BLD: 0.3 %
LIPASE SERPL-CCNC: 5090 U/L (ref 73–393)
LYMPHOCYTES # BLD AUTO: 1.6 10E9/L (ref 0.8–5.3)
LYMPHOCYTES NFR BLD AUTO: 22.2 %
MCH RBC QN AUTO: 30.8 PG (ref 26.5–33)
MCHC RBC AUTO-ENTMCNC: 35.1 G/DL (ref 31.5–36.5)
MCV RBC AUTO: 88 FL (ref 78–100)
MONOCYTES # BLD AUTO: 0.4 10E9/L (ref 0–1.3)
MONOCYTES NFR BLD AUTO: 6 %
NEUTROPHILS # BLD AUTO: 4.8 10E9/L (ref 1.6–8.3)
NEUTROPHILS NFR BLD AUTO: 69.5 %
NRBC # BLD AUTO: 0 10*3/UL
NRBC BLD AUTO-RTO: 0 /100
PLATELET # BLD AUTO: 185 10E9/L (ref 150–450)
POTASSIUM SERPL-SCNC: 3.9 MMOL/L (ref 3.4–5.3)
PROT SERPL-MCNC: 6.1 G/DL (ref 6.8–8.8)
RBC # BLD AUTO: 4.74 10E12/L (ref 4.4–5.9)
SODIUM SERPL-SCNC: 138 MMOL/L (ref 133–144)
WBC # BLD AUTO: 7 10E9/L (ref 4–11)

## 2018-11-28 PROCEDURE — 96376 TX/PRO/DX INJ SAME DRUG ADON: CPT

## 2018-11-28 PROCEDURE — 25000132 ZZH RX MED GY IP 250 OP 250 PS 637

## 2018-11-28 PROCEDURE — 25000128 H RX IP 250 OP 636: Performed by: PHYSICIAN ASSISTANT

## 2018-11-28 PROCEDURE — 25000132 ZZH RX MED GY IP 250 OP 250 PS 637: Performed by: PHYSICIAN ASSISTANT

## 2018-11-28 PROCEDURE — 25000128 H RX IP 250 OP 636: Performed by: EMERGENCY MEDICINE

## 2018-11-28 PROCEDURE — G0378 HOSPITAL OBSERVATION PER HR: HCPCS

## 2018-11-28 PROCEDURE — 36415 COLL VENOUS BLD VENIPUNCTURE: CPT | Performed by: EMERGENCY MEDICINE

## 2018-11-28 PROCEDURE — 99217 ZZC OBSERVATION CARE DISCHARGE: CPT | Performed by: INTERNAL MEDICINE

## 2018-11-28 PROCEDURE — 80053 COMPREHEN METABOLIC PANEL: CPT | Performed by: EMERGENCY MEDICINE

## 2018-11-28 PROCEDURE — 83690 ASSAY OF LIPASE: CPT | Performed by: EMERGENCY MEDICINE

## 2018-11-28 PROCEDURE — 85025 COMPLETE CBC W/AUTO DIFF WBC: CPT | Performed by: EMERGENCY MEDICINE

## 2018-11-28 RX ORDER — OXYCODONE HYDROCHLORIDE 5 MG/1
5-10 TABLET ORAL
Qty: 15 TABLET | Refills: 0 | Status: SHIPPED | OUTPATIENT
Start: 2018-11-28 | End: 2019-09-25

## 2018-11-28 RX ADMIN — HYDROMORPHONE HYDROCHLORIDE 0.5 MG: 1 INJECTION, SOLUTION INTRAMUSCULAR; INTRAVENOUS; SUBCUTANEOUS at 02:06

## 2018-11-28 RX ADMIN — ACETAMINOPHEN 650 MG: 325 TABLET, FILM COATED ORAL at 11:10

## 2018-11-28 RX ADMIN — ACETAMINOPHEN 650 MG: 325 TABLET, FILM COATED ORAL at 00:54

## 2018-11-28 RX ADMIN — Medication 1 MG: at 00:54

## 2018-11-28 RX ADMIN — OXYCODONE HYDROCHLORIDE 5 MG: 5 TABLET ORAL at 11:10

## 2018-11-28 RX ADMIN — METOPROLOL SUCCINATE 50 MG: 50 TABLET, EXTENDED RELEASE ORAL at 07:54

## 2018-11-28 RX ADMIN — SODIUM CHLORIDE, POTASSIUM CHLORIDE, SODIUM LACTATE AND CALCIUM CHLORIDE: 600; 310; 30; 20 INJECTION, SOLUTION INTRAVENOUS at 02:06

## 2018-11-28 RX ADMIN — SODIUM CHLORIDE, POTASSIUM CHLORIDE, SODIUM LACTATE AND CALCIUM CHLORIDE: 600; 310; 30; 20 INJECTION, SOLUTION INTRAVENOUS at 06:36

## 2018-11-28 RX ADMIN — NICOTINE 1 PATCH: 7 PATCH, EXTENDED RELEASE TRANSDERMAL at 07:56

## 2018-11-28 RX ADMIN — HYDROMORPHONE HYDROCHLORIDE 0.5 MG: 1 INJECTION, SOLUTION INTRAMUSCULAR; INTRAVENOUS; SUBCUTANEOUS at 05:41

## 2018-11-28 RX ADMIN — OMEPRAZOLE 20 MG: 20 CAPSULE, DELAYED RELEASE ORAL at 06:41

## 2018-11-28 NOTE — PLAN OF CARE
"Problem: Patient Care Overview  Goal: Plan of Care/Patient Progress Review  [place] [facility] [place]    Patient admitted to room 2213 at approximately 2245 via wheel chair from emergency room. Patient was accompanied by spouse and transport tech.     Verbal SBAR report received from Radha GUTIERREZ RN prior to patient arrival.     Patient ambulated to bed independently. Patient alert and oriented X 3. Pain is not well controlled.  Medication(s) being used: narcotic analgesics including hydromorphone (Dilaudid). 0-10 Pain Scale: 7. Admission vital signs: Blood pressure (!) 165/117, pulse 98, temperature 99.4  F (37.4  C), temperature source Oral, resp. rate 20, height 1.854 m (6' 1\"), weight 85.4 kg (188 lb 4.4 oz), SpO2 99 %. Patient and spouse were oriented to plan of care, call light, bed controls, tv, telephone, bathroom and visiting hours.     Risk Assessment    The following safety risks were identified during admission: none. Yellow risk band applied: NO.     Skin Initial Assessment    This writer admitted this patient and completed a full skin assessment and Carrington score in the Adult PCS flowsheet. Appropriate interventions initiated as needed.     Secondary skin check completed by Refused by pt.    Skin  Skin WDL: WDL    Carrington Risk Assessment  Sensory Perception: 4-->no impairment  Moisture: 4-->rarely moist  Activity: 4-->walks frequently  Mobility: 4-->no limitation  Nutrition: 3-->adequate  Friction and Shear: 3-->no apparent problem  Carrington Score: 22    Noemi Mahmood        "

## 2018-11-28 NOTE — H&P
[facility]    History and Physical  Hospital Medicine       Date of Admission:  11/27/2018  Date of Service: 11/27/2018     CC: abdominal pain    Assessment & Plan   Ed Mckenzie is a 33 year old male with a past medical history significant for gout, GERD, alcohol abuse with history of alcohol pancreatitis, and tobacco dependence who presents on 11/27/2018 with abdominal pain secondary to pancreatitis.      Alcohol-induced acute pancreatitis  Admitted with lipase 2444, recent alcohol intake. Abdominal ultrasound showing normal gallbladder, negative sonographic Schneider sign, no gallbladder wall thickening, no evidence of cholelithiasis or cholecystitis. LFTs normal. Patient was given 3L IV fluids and pain medications in the emergency department.  - Maintenance LR @ 200 ml/hr  - Prn pain medications - IV dilaudid and PO oxycodone available   - Prn antiemetics  - NPO, advance to clears as tolerated  - Daily lipase  - Patient is motivated to discharge, could likely go home tomorrow if tolerating oral intake      Benign essential hypertension  Systolic pressures in 180's in the emergency department, received 0.1 mg clonidine x 2 in the emergency department with some improvement. Hypertension managed with metoprolol XL 50 mg daily prior to admission.  - Continue prior to admission metoprolol  - Prn hydralazine available for SBP > 175      Alcohol abuse  No longer a daily drinker per report, but admits to drinking ~1.75L vodka over the past 5-6 days due to holidays. Last reported drink was about 36 hours prior to admission. No known history of withdrawal, no evidence of withdrawal on exam.  - Monitor on CIWA  - Did not use CIWA order set, but prn Ativan available to be administered prn per CIWA      Gastroesophageal reflux disease with esophagitis  Managed prior to admission with omeprazole, continue.      Tobacco dependence syndrome  Smokes < 0.5 ppd. Encourage cessation.   Nicotine patch  available, patient declines starting it until the morning of 11/28.      History of gout  No current or recent flares. Not on any preventative medications.  - No intervention      Fluids: LR @ 200 ml/hr  Electrolytes: Monitor  Nutrition: NPO, advance as tolerated    DVT Prophylaxis: Low Risk/Ambulatory with no VTE prophylaxis indicated  Code Status: Full Code - discussed directly with patient    Lines: Peripheral  Payton catheter: Not indicated    Disposition: Anticipate discharge in 1-2 day(s). Appropriate for observation care. Patient is motivated to discharge home once he is tolerating oral intake.    Discussion: Assessment & plan discussed with Dr. Estuardo Castillo PA-C  [facility]  Pager: [phone]          Primary Care Physician   Chelle Rashid [phone]    History is obtained from the patient, his wife, and review of old records via the EMR.    Past Medical History      Past Medical History:   Diagnosis Date     Acute idiopathic gout of multiple sites 10/16/2017     Acute pancreatitis without infection or necrosis 7/10/2018     Patient Active Problem List    Diagnosis Date Noted     History of gout 11/27/2018     Priority: Medium     Tobacco dependence syndrome 11/27/2018     Priority: Medium     Gastroesophageal reflux disease with esophagitis 11/27/2018     Priority: Medium     Benign essential hypertension 07/18/2018     Priority: Medium        Past Surgical History     Past Surgical History:   Procedure Laterality Date     HERNIA REPAIR          History of Present Illness   Ed Mckenzie is a 33 year old male with the above past medical history now presents on 11/27/2018 with epigastric abdominal pain.    Patient woke in his usual state of health. Around 11 am he developed acute epigastric pain that has worsened since onset. Pain radiates from epigastric area out to bilateral lower abdominal quadrants. Pain is constant rated 7/10 at worst, 5/10 at  best. Described as a constant pressure and a sharp pain. Improves with dilaudid, worsens when he hiccups and with movement and certain positions.    He has no appetite and developed some nausea since onset, but no vomiting.     He has a history of one prior episode of alcohol related pancreatitis in July 2018. At that time he had been drinking 2-3 cocktails daily, but since then he has cut back, drinking a cocktail a few times a week. However, he admits that over Thanksgiving he has consumed an estimated 1.75L vodka total over the past 5 days. His last drink was 11/25 (2 days prior to admission). Denies any history of withdrawal.     On review of systems he admits to occasional ankle swelling with gout flares, but has not had any recent gout flares. He has a chronic cough which he attributes to his tobacco use, but denies any respiratory changes from baseline. The remainder review of systems is negative.      Prior to Admission Medications   Prior to Admission Medications   Prescriptions Last Dose Informant Patient Reported? Taking?   Multiple Vitamin (ONE-A-DAY MENS PO) Past Month at Unknown time Self Yes Yes   Sig: Take 2 chew tab by mouth daily   Omeprazole Magnesium (PRILOSEC OTC PO) 11/27/2018 at am Self Yes Yes   Sig: Take 1 tablet by mouth daily    ibuprofen 200 MG capsule 0 today Self Yes No   Sig: Take 800 mg by mouth every 8 hours as needed for fever    metoprolol succinate (TOPROL-XL) 50 MG 24 hr tablet 11/27/2018 at am Self No Yes   Sig: Take 1 tablet (50 mg) by mouth daily      Facility-Administered Medications: None     Allergies   Allergies   Allergen Reactions     Penicillins Anaphylaxis       Family History    Family History   Problem Relation Age of Onset     Breast Cancer Mother      Ovarian Cancer Mother 58       Social History   Social History     Social History     Marital status: Single     Spouse name: N/A     Number of children: N/A     Years of education: N/A     Occupational History      "Not on file.     Social History Main Topics     Smoking status: Current Every Day Smoker     Packs/day: 0.50     Smokeless tobacco: Never Used      Comment: currently using lozenges     Alcohol use Yes      Comment: 2-3 cocktails per night     Drug use: No     Sexual activity: Not on file     Other Topics Concern     Parent/Sibling W/ Cabg, Mi Or Angioplasty Before 65f 55m? No     Social History Narrative       Review of Systems     A complete 10 point review of systems was negative except for items noted in the HPI/subjective.      Physical Exam   BP (!) 165/117 (BP Location: Left arm)  Pulse 98  Temp 99.4  F (37.4  C) (Oral)  Resp 20  Ht 1.854 m (6' 1\")  Wt 83.9 kg (185 lb)  SpO2 99%  BMI 24.41 kg/m2     Weight: 185 lbs 0 oz Body mass index is 24.41 kg/(m^2).     Constitutional: Alert, oriented, cooperative, no apparent distress, appears nontoxic, speaking in full sentences.     Eyes: Eyes are clear, pupils are reactive. No scleral icterus. Extroccular movements intact.     HEENT: Oropharynx is clear and moist, no lesions. Normocephalic, no evidence of cranial trauma.        Cardiovascular: Regular rhythm and rate, normal S1 and S2. No murmur, rubs, or gallops. Peripheral pulses in tact bilaterally. No lower extremity edema.    Respiratory: Lung sounds are clear to auscultation bilaterally without wheezes, rhonchi, or crackles.    GI: Soft, non-distended. Tender to palpation, especially in epigastric region, no rebound or guarding. No hepatosplenomegaly or masses appreciated. Normal bowel sounds.     Musculoskeletal: Without obvious deformity, normal range of motion. Normal muscle bulk and tone.     Skin: Warm and dry, no rashes or ecchymoses. No mottling of skin.      Neurologic: Patient moves all extremities. Cranial nerves are grossly intact.  is symmetric. Gross strength and sensation are equal bilaterally.    Genitourinary: Deferred    Data   Data reviewed today:     Recent Labs  Lab 11/27/18  1618 "   WBC 11.0   HGB 17.5   MCV 87      *   POTASSIUM 3.6   CHLORIDE 95   CO2 22   BUN 10   CR 1.07   ANIONGAP 14   LOWELL 9.5   *   ALBUMIN 4.2   PROTTOTAL 8.3   BILITOTAL 1.3   ALKPHOS 190*   ALT 56   AST 43   LIPASE 2444*       Recent Results (from the past 24 hour(s))   POC US ABDOMEN LIMITED    Impression    [facility] Procedure Note      Limited Bedside ED Gallbladder  Ultrasound:    PROCEDURE: PERFORMED BY: Dr. Naeem Bhakta  INDICATIONS:  Abdominal Pain  PROBE:  Low frequency convex probe  BODY LOCATION: Abdomen  FINDINGS:   An ultrasound of the gallbladder was performed using longitudinal and transverse views.  Gallstone(s):  Absent  Gallbladder sludge:  Absent  Sonographic Schneider's sign:  Absent  Gallbladder wall thickening (greater than 4 mm):  Absent  Pericholecystic fluid: Absent  Common bile duct (dilated if internal diameter greater than 6 mm): Unable to visualize   INTERPRETATION:  The gallbladder evaluation is normal with no gallstones/sludge, no sonographic Schneider s sign, no GB wall thickening, no pericholecystic fluid, and without evidence of cholelithiasis or cholecystitis.  IMAGE DOCUMENTATION: Images were archived to PACs system.        I personally reviewed no images or EKG's today.    Lashanda Castillo PA-C  [facility]  Pager: [phone]

## 2018-11-28 NOTE — ED NOTES
No changes in PT condition from previous assessment. PT is sitting in position of comfort and watching TV. PT to be admitted, awaiting admit orders and bed placement.

## 2018-11-28 NOTE — DISCHARGE SUMMARY
[facility]ist Discharge Summary    Ed Mckenzie MRN# [id number]   Age: 33 year old YOB: 1985     Date of Admission:  11/27/2018  Date of Discharge::  11/28/2018  Admitting Physician:  No admitting provider for patient encounter.  Discharge Physician:  Sakina Wills MD  Primary Physician: Chelle Rashid  Transferring Facility: N/A     Home clinic: [facility]          Admission Diagnoses:   Alcohol-induced acute pancreatitis without infection or necrosis [K85.20]          Discharge Diagnosis:   Principle diagnosis: Alcohol-induced acute pancreatitis  Secondary diagnoses:  Principal Problem:    Alcohol-induced acute pancreatitis  Active Problems:    Benign essential hypertension    History of gout    Alcohol abuse    Tobacco dependence syndrome    Gastroesophageal reflux disease with esophagitis    Acute pancreatitis without infection or necrosis    Pancreatitis         Brief History of Presenting Illness:   As per admit hx  Ed Mckenzie is a 33 year old male with the above past medical history now presents on 11/27/2018 with epigastric abdominal pain.     Patient woke in his usual state of health. Around 11 am he developed acute epigastric pain that has worsened since onset. Pain radiates from epigastric area out to bilateral lower abdominal quadrants. Pain is constant rated 7/10 at worst, 5/10 at best. Described as a constant pressure and a sharp pain. Improves with dilaudid, worsens when he hiccups and with movement and certain positions.     He has no appetite and developed some nausea since onset, but no vomiting.      He has a history of one prior episode of alcohol related pancreatitis in July 2018. At that time he had been drinking 2-3 cocktails daily, but since then he has cut back, drinking a cocktail a few times a week. However, he admits that over Thanksgiving he has consumed an estimated 1.75L vodka total over the past 5 days. His last drink was 11/25 (2 days  prior to admission). Denies any history of withdrawal.      On review of systems he admits to occasional ankle swelling with gout flares, but has not had any recent gout flares. He has a chronic cough which he attributes to his tobacco use, but denies any respiratory changes from baseline. The remainder review of systems is negative.    Recent Results (from the past 24 hour(s))   POC US ABDOMEN LIMITED    [facility] Procedure Note      Limited Bedside ED Gallbladder  Ultrasound:    PROCEDURE: PERFORMED BY: Dr. Naeem Bhakta  INDICATIONS:  Abdominal Pain  PROBE:  Low frequency convex probe  BODY LOCATION: Abdomen  FINDINGS:   An ultrasound of the gallbladder was performed using longitudinal and transverse views.  Gallstone(s):  Absent  Gallbladder sludge:  Absent  Sonographic Schneider's sign:  Absent  Gallbladder wall thickening (greater than 4 mm):  Absent  Pericholecystic fluid: Absent  Common bile duct (dilated if internal diameter greater than 6 mm): Unable to visualize   INTERPRETATION:  The gallbladder evaluation is normal with no gallstones/sludge, no sonographic Schneider s sign, no GB wall thickening, no pericholecystic fluid, and without evidence of cholelithiasis or cholecystitis.  IMAGE DOCUMENTATION: Images were archived to PACs system.             Hospital Course:      Alcohol-induced acute pancreatitis  Admitted with lipase 2444, recent alcohol intake. Abdominal ultrasound showing normal gallbladder, negative sonographic Schneider sign, no gallbladder wall thickening, no evidence of cholelithiasis or cholecystitis. LFTs normal. Patient was given 3L IV fluids and pain medications in the emergency department and maintained on iv fluid drip.   Lipase is worse at 5k today but pt feels better and is tolerating regular diet--wants to go home. Says he knows he needs to quit drinking completely and will try on his own.  Discussed on watching a day more in hospital due to elevated lipase-but  pt feels 10x better and wants to go home.        Benign essential hypertension  Stable   - Continue prior to admission metoprolol        Alcohol abuse  No longer a daily drinker per report, but admits to drinking ~1.75L vodka over the past 5-6 days due to holidays. Last reported drink was about 36 hours prior to admission. No known history of withdrawal, no evidence of withdrawal on exam.  Wants to quit on his own        Gastroesophageal reflux disease with esophagitis  Managed prior to admission with omeprazole, continue.        Tobacco dependence syndrome  Smokes < 0.5 ppd. Encourage cessation.           History of gout  No current or recent flares. Not on any preventative medications.  - No intervention             Procedures:   No procedures performed during this admission         Allergies:      Allergies   Allergen Reactions     Penicillins Anaphylaxis             Medications Prior to Admission:     Prescriptions Prior to Admission   Medication Sig Dispense Refill Last Dose     metoprolol succinate (TOPROL-XL) 50 MG 24 hr tablet Take 1 tablet (50 mg) by mouth daily 90 tablet 1 11/27/2018 at am     Multiple Vitamin (ONE-A-DAY MENS PO) Take 2 chew tab by mouth daily   Past Month at Unknown time     Omeprazole Magnesium (PRILOSEC OTC PO) Take 1 tablet by mouth daily    11/27/2018 at am     ibuprofen 200 MG capsule Take 800 mg by mouth every 8 hours as needed for fever    0 today             Discharge Medications:     Current Discharge Medication List      START taking these medications    Details   ondansetron (ZOFRAN ODT) 4 MG ODT tab Take 1 tablet (4 mg) by mouth every 8 hours as needed  Qty: 10 tablet, Refills: 0      !! oxyCODONE (ROXICODONE) 5 MG tablet Take 1-2 tablets (5-10 mg) by mouth every 3 hours as needed  Qty: 15 tablet, Refills: 0    Associated Diagnoses: Alcohol-induced acute pancreatitis without infection or necrosis      !! oxyCODONE (ROXICODONE) 5 MG tablet Take 1 tablet (5 mg) by mouth every 6  "hours as needed for pain  Qty: 12 tablet, Refills: 0       !! - Potential duplicate medications found. Please discuss with provider.      CONTINUE these medications which have NOT CHANGED    Details   metoprolol succinate (TOPROL-XL) 50 MG 24 hr tablet Take 1 tablet (50 mg) by mouth daily  Qty: 90 tablet, Refills: 1    Associated Diagnoses: Essential hypertension      Multiple Vitamin (ONE-A-DAY MENS PO) Take 2 chew tab by mouth daily      Omeprazole Magnesium (PRILOSEC OTC PO) Take 1 tablet by mouth daily       ibuprofen 200 MG capsule Take 800 mg by mouth every 8 hours as needed for fever                    Consultations:   No consultations were requested during this admission            Discharge Exam:   Blood pressure (!) 151/98, pulse 77, temperature 98.6  F (37  C), temperature source Oral, resp. rate 18, height 1.854 m (6' 1\"), weight 85.4 kg (188 lb 4.4 oz), SpO2 99 %.  GENERAL APPEARANCE: healthy, alert and no distress  EYES: conjunctiva clear, eyes grossly normal  HENT: external ears and nose normal   NECK: supple, no masses or adenopathy  RESP: lungs clear to auscultation - no rales, rhonchi or wheezes  CV: regular rate and rhythm, normal S1 S2, no S3 or S4 and no murmur, click or rub   ABDOMEN: soft, nontender, no HSM or masses and bowel sounds normal  MS: no clubbing, cyanosis; no edema  SKIN: clear without significant rashes or lesions  NEURO: Normal strength and tone, sensory exam grossly normal, mentation intact and speech normal    Unresulted Labs Ordered in the Past 30 Days of this Admission     No orders found for last 61 day(s).          Recent Results (from the past 24 hour(s))   POC US ABDOMEN LIMITED    Impression    [facility] Procedure Note      Limited Bedside ED Gallbladder  Ultrasound:    PROCEDURE: PERFORMED BY: Dr. Naeem Bhakta  INDICATIONS:  Abdominal Pain  PROBE:  Low frequency convex probe  BODY LOCATION: Abdomen  FINDINGS:   An ultrasound of the gallbladder was " performed using longitudinal and transverse views.  Gallstone(s):  Absent  Gallbladder sludge:  Absent  Sonographic Schneider's sign:  Absent  Gallbladder wall thickening (greater than 4 mm):  Absent  Pericholecystic fluid: Absent  Common bile duct (dilated if internal diameter greater than 6 mm): Unable to visualize   INTERPRETATION:  The gallbladder evaluation is normal with no gallstones/sludge, no sonographic Schneider s sign, no GB wall thickening, no pericholecystic fluid, and without evidence of cholelithiasis or cholecystitis.  IMAGE DOCUMENTATION: Images were archived to PACs system.             Pending Tests at Discharge:   None         Discharge Instructions and Follow-Up:   Discharge diet: Regular   Discharge activity: Activity as tolerated   Discharge follow-up: Follow up with primary care provider in 1-2 weeks           Discharge Disposition:   Discharged to home      Attestation:  I have reviewed today's vital signs, notes, medications, labs and imaging.    Time Spent on this Encounter   ISakina, personally saw the patient today and spent less than or equal to 30 minutes discharging this patient.    Sakina Wills MD

## 2018-11-28 NOTE — ED PROVIDER NOTES
Emergency Department Patient Sign-out       Brief HPI:  This is a 33 year old male signed out to me by Dr. Bhakta follow-up on patient's adjustment to his pain medication and ability to drink fluids.  Patient had been given discharge orders in case symptoms remained tolerable..  See initial ED Provider note for details of the presentation.     Patient was observed and drank liquids but had some return of pain.  He was given another dose of Dilaudid and also given clonidine for his elevated blood pressure.  The pain continued to be present therefore it was decided to admit the patient for further evaluation and care.            Exam:   Patient Vitals for the past 24 hrs:   BP Temp Temp src Pulse Heart Rate Resp SpO2 Weight   11/27/18 1800 (!) 190/123 - - - 86 18 99 % -   11/27/18 1730 (!) 183/129 - - - - - 99 % -   11/27/18 1715 - - - - - - 99 % -   11/27/18 1700 (!) 195/129 - - - 80 18 99 % -   11/27/18 1645 - - - - - - 100 % -   11/27/18 1630 (!) 186/130 - - - - - 98 % -   11/27/18 1610 (!) 184/112 - - - 84 18 99 % -   11/27/18 1600 (!) 171/119 97.1  F (36.2  C) Temporal 101 - 18 98 % 83.9 kg (185 lb)           ED RESULTS:   Results for orders placed or performed during the hospital encounter of 11/27/18 (from the past 24 hour(s))   POC US ABDOMEN LIMITED     Status: None    Collection Time: 11/27/18  4:15 PM    [facility] Procedure Note      Limited Bedside ED Gallbladder  Ultrasound:    PROCEDURE: PERFORMED BY: Dr. Naeem Bhakta  INDICATIONS:  Abdominal Pain  PROBE:  Low frequency convex probe  BODY LOCATION: Abdomen  FINDINGS:   An ultrasound of the gallbladder was performed using longitudinal and transverse views.  Gallstone(s):  Absent  Gallbladder sludge:  Absent  Sonographic Schneider's sign:  Absent  Gallbladder wall thickening (greater than 4 mm):  Absent  Pericholecystic fluid: Absent  Common bile duct (dilated if internal diameter greater than 6 mm): Unable to visualize    INTERPRETATION:  The gallbladder evaluation is normal with no gallstones/sludge, no sonographic Schneider s sign, no GB wall thickening, no pericholecystic fluid, and without evidence of cholelithiasis or cholecystitis.  IMAGE DOCUMENTATION: Images were archived to PACs system.    CBC with platelets differential     Status: Abnormal    Collection Time: 11/27/18  4:18 PM   Result Value Ref Range    WBC 11.0 4.0 - 11.0 10e9/L    RBC Count 5.58 4.4 - 5.9 10e12/L    Hemoglobin 17.5 13.3 - 17.7 g/dL    Hematocrit 48.7 40.0 - 53.0 %    MCV 87 78 - 100 fl    MCH 31.4 26.5 - 33.0 pg    MCHC 35.9 31.5 - 36.5 g/dL    RDW 11.9 10.0 - 15.0 %    Platelet Count 279 150 - 450 10e9/L    Diff Method Automated Method     % Neutrophils 82.5 %    % Lymphocytes 9.3 %    % Monocytes 6.7 %    % Eosinophils 0.5 %    % Basophils 0.6 %    % Immature Granulocytes 0.4 %    Nucleated RBCs 0 0 /100    Absolute Neutrophil 9.1 (H) 1.6 - 8.3 10e9/L    Absolute Lymphocytes 1.0 0.8 - 5.3 10e9/L    Absolute Monocytes 0.7 0.0 - 1.3 10e9/L    Absolute Eosinophils 0.1 0.0 - 0.7 10e9/L    Absolute Basophils 0.1 0.0 - 0.2 10e9/L    Abs Immature Granulocytes 0.0 0 - 0.4 10e9/L    Absolute Nucleated RBC 0.0    Comprehensive metabolic panel     Status: Abnormal    Collection Time: 11/27/18  4:18 PM   Result Value Ref Range    Sodium 131 (L) 133 - 144 mmol/L    Potassium 3.6 3.4 - 5.3 mmol/L    Chloride 95 94 - 109 mmol/L    Carbon Dioxide 22 20 - 32 mmol/L    Anion Gap 14 3 - 14 mmol/L    Glucose 125 (H) 70 - 99 mg/dL    Urea Nitrogen 10 7 - 30 mg/dL    Creatinine 1.07 0.66 - 1.25 mg/dL    GFR Estimate 79 >60 mL/min/1.7m2    GFR Estimate If Black >90 >60 mL/min/1.7m2    Calcium 9.5 8.5 - 10.1 mg/dL    Bilirubin Total 1.3 0.2 - 1.3 mg/dL    Albumin 4.2 3.4 - 5.0 g/dL    Protein Total 8.3 6.8 - 8.8 g/dL    Alkaline Phosphatase 190 (H) 40 - 150 U/L    ALT 56 0 - 70 U/L    AST 43 0 - 45 U/L   Lipase     Status: Abnormal    Collection Time: 11/27/18  4:18 PM    Result Value Ref Range    Lipase 2444 (H) 73 - 393 U/L       ED MEDICATIONS:   Medications   ondansetron (ZOFRAN) injection 4 mg (not administered)   lactated ringers BOLUS 1,000 mL (0 mLs Intravenous Stopped 11/27/18 1739)   HYDROmorphone (PF) (DILAUDID) injection 0.5 mg (0.5 mg Intravenous Given 11/27/18 1913)   ranitidine (ZANTAC) tablet 300 mg (300 mg Oral Given 11/27/18 1619)   lactated ringers BOLUS 1,000 mL (0 mLs Intravenous Stopped 11/27/18 2103)   oxyCODONE (ROXICODONE) tablet 10 mg (10 mg Oral Given 11/27/18 1738)   cloNIDine (CATAPRES) tablet 0.1 mg (0.1 mg Oral Given 11/27/18 1911)   cloNIDine (CATAPRES) tablet 0.1 mg (0.1 mg Oral Given 11/27/18 2036)   HYDROmorphone (PF) (DILAUDID) injection 0.5 mg (0.5 mg Intravenous Given 11/27/18 2118)         Impression:    ICD-10-CM    1. Alcohol-induced acute pancreatitis without infection or necrosis K85.20        Plan:    Patient will be admitted to the hospitalist service for further evaluation and care.      Juan C Muñoz MD  11/29/18 100

## 2018-11-28 NOTE — PHARMACY - DISCHARGE MEDICATION RECONCILIATION
Discharge medication review for this patient is complete. Pharmacist assisted with medication reconciliation of discharge medications with prior to admission medications.     The following changes were made to the discharge medication list based on pharmacist review:  Added:  Oxycodone and zofran  Discontinued: NA  Changed: NA      Patient's Discharge Medication List  - medications as listed on After Visit Summary (AVS)     Review of your medicines      START taking       Dose / Directions    ondansetron 4 MG ODT tab   Commonly known as:  ZOFRAN ODT        Dose:  4 mg   Take 1 tablet (4 mg) by mouth every 8 hours as needed   Quantity:  10 tablet   Refills:  0       * oxyCODONE 5 MG tablet   Commonly known as:  ROXICODONE        Dose:  5 mg   Take 1 tablet (5 mg) by mouth every 6 hours as needed for pain   Quantity:  12 tablet   Refills:  0       * oxyCODONE 5 MG tablet   Commonly known as:  ROXICODONE   Used for:  Alcohol-induced acute pancreatitis without infection or necrosis        Dose:  5-10 mg   Take 1-2 tablets (5-10 mg) by mouth every 3 hours as needed   Quantity:  15 tablet   Refills:  0       * Notice:  This list has 2 medication(s) that are the same as other medications prescribed for you. Read the directions carefully, and ask your doctor or other care provider to review them with you.      CONTINUE these medicines which have NOT CHANGED       Dose / Directions    ibuprofen 200 MG capsule   Commonly known as:  ADVIL/MOTRIN        Dose:  800 mg   Take 800 mg by mouth every 8 hours as needed for fever   Refills:  0       metoprolol succinate 50 MG 24 hr tablet   Commonly known as:  TOPROL-XL   Used for:  Essential hypertension        Dose:  50 mg   Take 1 tablet (50 mg) by mouth daily   Quantity:  90 tablet   Refills:  1       ONE-A-DAY MENS PO        Dose:  2 chew tab   Take 2 chew tab by mouth daily   Refills:  0       PRILOSEC OTC PO        Dose:  1 tablet   Take 1 tablet by mouth daily   Refills:  0             Where to get your medicines      These medications were sent to [facility] [id number] [facility] - [place], [place] - [address] [place] [id number]     Phone:  [phone]      ondansetron 4 MG ODT tab         Some of these will need a paper prescription and others can be bought over the counter. Ask your nurse if you have questions.     Bring a paper prescription for each of these medications      oxyCODONE 5 MG tablet     oxyCODONE 5 MG tablet

## 2018-11-28 NOTE — DISCHARGE INSTRUCTIONS
Return to the emergency department if you have repeated vomiting, severe pain, fevers, or other concerns.  Otherwise follow-up in clinic for recheck.    Use ibuprofen and acetaminophen for your symptoms.  Use oxycodone as needed for pain that is not controlled by the prior two medications.    Oxycodone is an addictive opioid medication, please only take it when the pain is more than can be controlled by acetaminophen or ibuprofen alone. It will also make you lightheaded, nauseated, and constipated.  Do not drive, operate heavy machinery, or take care of young children while taking this medication.     Repeated studies have shown that the longer you use opioid pain medications, the longer it is until you return to normal function. It is our recommendation that you taper off opioids as quickly as possible with the goal of returning to normal function or near normal function. Long term use of opioids quickly results in growing tolerance to the medication (less of the benefits) and increased dependence (more of the bad side effects).     Pain is very difficult to treat and we can very rarely take away pain completely. If you are having difficulty with pain over several weeks after an injury, you may need to start different medications and therapies (such as physical therapy, graded exercise, massage, and acupuncture). Please talk about this with your regular doctor.     If you are interested in seeking free, confidential treatment referral and information service for individuals and families facing mental health and/or substance use disorders please call [phone])

## 2018-11-28 NOTE — ED NOTES
"Patient has  [place] to Observation  order. Patient has been given the Observation brochure -  What does Observation mean to me.\"  Patient has been given the opportunity to ask questions about observation status and their plan of care.      Yoselin Escalera  "

## 2018-11-28 NOTE — PLAN OF CARE
Problem: Patient Care Overview  Goal: Plan of Care/Patient Progress Review  Outcome: Improving  Reports slept well, requested pain med when awakened by staff for labs, states he is done w/ drinking. Pleasant man. NPO except sips w/ meds. Denies any nausea, CIWA 0.

## 2018-11-28 NOTE — PLAN OF CARE
Problem: Patient Care Overview  Goal: Plan of Care/Patient Progress Review  Outcome: Adequate for Discharge Date Met: 11/28/18  [facility] DISCHARGE NOTE    Patient tolerating regular diet. Going out to smoke. Explained policy to him. He verbalized understanding. Patient discharged to home at 1:10 PM via ambulation. Accompanied by spouse and staff. Discharge instructions reviewed with patient, opportunity offered to ask questions. Prescriptions sent to patients preferred pharmacy. Pharmacy to destroy 15 tablet prescription of oxycodone. Patient will  12 tablet prescription. All belongings sent with patient.    Jeannine Juarez

## 2018-11-28 NOTE — PLAN OF CARE
Problem: Patient Care Overview  Goal: Plan of Care/Patient Progress Review  Outcome: Improving  Patient tolerated clear liquid diet. He requests regular diet. Updated Dr. Wills see order for regular diet.

## 2018-11-29 ENCOUNTER — TELEPHONE (OUTPATIENT)
Dept: FAMILY MEDICINE | Facility: CLINIC | Age: 33
End: 2018-11-29

## 2018-11-29 NOTE — TELEPHONE ENCOUNTER
"ED/Discharge Protocol    \"Hi, my name is Omaira Fisher, a registered nurse, and I am calling on behalf of Dr. Rashid's office at [place].  I am calling to follow up and see how things are going for you after your recent visit.\"    \"I see that you were in the (ER/UC/IP) on 11/27/18.    How are you doing now that you are home?\" patient reports he is doing good, no nausea and has not needed to use Oxycodone that was given to him at discharge. Patient reports is using Ibuprofen 800 mg for pain when needed.     Is patient experiencing symptoms that may require a hospital visit?  None    Discharge Instructions    \"Let's review your discharge instructions.  What is/are the follow-up recommendations?  Pt. Response: yes, patient asks this writer to cancel appointment as he is busy all next week and needs to check with his wife for further appointment times. Patient reports he will call back to schedule.     \"Were you instructed to make a follow-up appointment?\"  Pt. Response: Yes.  Has appointment been made?   Yes, see comment above about pt requesting to cancel appointment.       \"When you see the provider, I would recommend that you bring your discharge instructions with you.    Medications    \"How many new medications are you on since your hospitalization/ED visit?\"    2 or more - Epic MTM referral needed  \"How many of your current medicines changed (dose, timing, name, etc.) while you were in the hospital/ED visit?\"   0-1  \"Do you have questions about your medications?\"   No  \"Were you newly diagnosed with heart failure, COPD, diabetes or did you have a heart attack?\"   No  For patients on insulin: \"Did you start on insulin in the hospital or did you have your insulin dose changed?\"   No    Medication reconciliation completed? Yes    Was MTM referral placed (*Make sure to put transitions as reason for referral)?   No, declined at this time.     Call Summary    \"Do you have any questions or concerns about your " "condition or care plan at the moment?\"    No  Triage nurse advice given: f/u as advised at discharge. Call or seek emergency care of r any new or worsening symptoms, failure to improve.         \"If you have questions or things don't continue to improve, we encourage you contact us through the main clinic number,  [phone].  Even if the clinic is not open, triage nurses are available 24/7 to help you.     We would like you to know that our clinic has extended hours (provide information).  We also have urgent care (provide details on closest location and hours/contact info)\"      \"Thank you for your time and take care!\"        "

## 2019-09-16 ENCOUNTER — APPOINTMENT (OUTPATIENT)
Dept: CT IMAGING | Facility: CLINIC | Age: 34
End: 2019-09-16
Attending: FAMILY MEDICINE
Payer: COMMERCIAL

## 2019-09-16 ENCOUNTER — HOSPITAL ENCOUNTER (EMERGENCY)
Facility: CLINIC | Age: 34
Discharge: HOME | End: 2019-09-17
Attending: FAMILY MEDICINE | Admitting: FAMILY MEDICINE
Payer: COMMERCIAL

## 2019-09-16 DIAGNOSIS — K70.10 ALCOHOLIC HEPATITIS, UNSPECIFIED WHETHER ASCITES PRESENT (H): ICD-10-CM

## 2019-09-16 DIAGNOSIS — S16.1XXA STRAIN OF NECK MUSCLE, INITIAL ENCOUNTER: ICD-10-CM

## 2019-09-16 DIAGNOSIS — F10.920 ALCOHOLIC INTOXICATION WITHOUT COMPLICATION (H): ICD-10-CM

## 2019-09-16 DIAGNOSIS — S09.90XA CLOSED HEAD INJURY, INITIAL ENCOUNTER: ICD-10-CM

## 2019-09-16 DIAGNOSIS — K21.9 GASTROESOPHAGEAL REFLUX DISEASE, ESOPHAGITIS PRESENCE NOT SPECIFIED: ICD-10-CM
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
## 2019-09-16 LAB
ALBUMIN SERPL-MCNC: 3.7 G/DL (ref 3.4–5)
ALP SERPL-CCNC: 179 U/L (ref 40–150)
ALT SERPL W P-5'-P-CCNC: 110 U/L (ref 0–70)
ANION GAP SERPL CALCULATED.3IONS-SCNC: 14 MMOL/L (ref 3–14)
AST SERPL W P-5'-P-CCNC: 101 U/L (ref 0–45)
BASOPHILS # BLD AUTO: 0.1 10E9/L (ref 0–0.2)
BASOPHILS NFR BLD AUTO: 2.1 %
BILIRUB SERPL-MCNC: 0.4 MG/DL (ref 0.2–1.3)
BUN SERPL-MCNC: 7 MG/DL (ref 7–30)
CALCIUM SERPL-MCNC: 8.7 MG/DL (ref 8.5–10.1)
CHLORIDE SERPL-SCNC: 106 MMOL/L (ref 94–109)
CO2 SERPL-SCNC: 22 MMOL/L (ref 20–32)
CREAT SERPL-MCNC: 0.93 MG/DL (ref 0.66–1.25)
DIFFERENTIAL METHOD BLD: NORMAL
EOSINOPHIL # BLD AUTO: 0.2 10E9/L (ref 0–0.7)
EOSINOPHIL NFR BLD AUTO: 3.4 %
ERYTHROCYTE [DISTWIDTH] IN BLOOD BY AUTOMATED COUNT: 13.2 % (ref 10–15)
ETHANOL SERPL-MCNC: 0.28 G/DL
GFR SERPL CREATININE-BSD FRML MDRD: >90 ML/MIN/{1.73_M2}
GLUCOSE SERPL-MCNC: 94 MG/DL (ref 70–99)
HCT VFR BLD AUTO: 48.6 % (ref 40–53)
HGB BLD-MCNC: 16.8 G/DL (ref 13.3–17.7)
IMM GRANULOCYTES # BLD: 0 10E9/L (ref 0–0.4)
IMM GRANULOCYTES NFR BLD: 0.2 %
LIPASE SERPL-CCNC: 108 U/L (ref 73–393)
LYMPHOCYTES # BLD AUTO: 1.9 10E9/L (ref 0.8–5.3)
LYMPHOCYTES NFR BLD AUTO: 33.9 %
MCH RBC QN AUTO: 31.3 PG (ref 26.5–33)
MCHC RBC AUTO-ENTMCNC: 34.6 G/DL (ref 31.5–36.5)
MCV RBC AUTO: 91 FL (ref 78–100)
MONOCYTES # BLD AUTO: 0.4 10E9/L (ref 0–1.3)
MONOCYTES NFR BLD AUTO: 7.4 %
NEUTROPHILS # BLD AUTO: 3 10E9/L (ref 1.6–8.3)
NEUTROPHILS NFR BLD AUTO: 53 %
NRBC # BLD AUTO: 0 10*3/UL
NRBC BLD AUTO-RTO: 0 /100
PLATELET # BLD AUTO: 295 10E9/L (ref 150–450)
POTASSIUM SERPL-SCNC: 3.7 MMOL/L (ref 3.4–5.3)
PROT SERPL-MCNC: 7.7 G/DL (ref 6.8–8.8)
RBC # BLD AUTO: 5.37 10E12/L (ref 4.4–5.9)
SODIUM SERPL-SCNC: 142 MMOL/L (ref 133–144)
TROPONIN I SERPL-MCNC: <0.015 UG/L (ref 0–0.04)
WBC # BLD AUTO: 5.6 10E9/L (ref 4–11)

## 2019-09-16 PROCEDURE — C9113 INJ PANTOPRAZOLE SODIUM, VIA: HCPCS | Performed by: FAMILY MEDICINE

## 2019-09-16 PROCEDURE — 96375 TX/PRO/DX INJ NEW DRUG ADDON: CPT | Performed by: FAMILY MEDICINE

## 2019-09-16 PROCEDURE — 80320 DRUG SCREEN QUANTALCOHOLS: CPT | Performed by: EMERGENCY MEDICINE

## 2019-09-16 PROCEDURE — 85025 COMPLETE CBC W/AUTO DIFF WBC: CPT | Performed by: EMERGENCY MEDICINE

## 2019-09-16 PROCEDURE — 25000125 ZZHC RX 250: Performed by: FAMILY MEDICINE

## 2019-09-16 PROCEDURE — 83690 ASSAY OF LIPASE: CPT | Performed by: EMERGENCY MEDICINE

## 2019-09-16 PROCEDURE — 25000128 H RX IP 250 OP 636: Performed by: FAMILY MEDICINE

## 2019-09-16 PROCEDURE — 96374 THER/PROPH/DIAG INJ IV PUSH: CPT | Performed by: FAMILY MEDICINE

## 2019-09-16 PROCEDURE — 70450 CT HEAD/BRAIN W/O DYE: CPT

## 2019-09-16 PROCEDURE — 96361 HYDRATE IV INFUSION ADD-ON: CPT | Performed by: FAMILY MEDICINE

## 2019-09-16 PROCEDURE — 93005 ELECTROCARDIOGRAM TRACING: CPT | Performed by: FAMILY MEDICINE

## 2019-09-16 PROCEDURE — 84484 ASSAY OF TROPONIN QUANT: CPT | Performed by: EMERGENCY MEDICINE

## 2019-09-16 PROCEDURE — 83690 ASSAY OF LIPASE: CPT | Performed by: FAMILY MEDICINE

## 2019-09-16 PROCEDURE — 99285 EMERGENCY DEPT VISIT HI MDM: CPT | Mod: Z6 | Performed by: FAMILY MEDICINE

## 2019-09-16 PROCEDURE — 99285 EMERGENCY DEPT VISIT HI MDM: CPT | Mod: 25 | Performed by: FAMILY MEDICINE

## 2019-09-16 PROCEDURE — 25800030 ZZH RX IP 258 OP 636: Performed by: FAMILY MEDICINE

## 2019-09-16 PROCEDURE — 72125 CT NECK SPINE W/O DYE: CPT

## 2019-09-16 PROCEDURE — 80053 COMPREHEN METABOLIC PANEL: CPT | Performed by: EMERGENCY MEDICINE

## 2019-09-16 RX ORDER — HYDROMORPHONE HYDROCHLORIDE 1 MG/ML
0.5 INJECTION, SOLUTION INTRAMUSCULAR; INTRAVENOUS; SUBCUTANEOUS ONCE
Status: COMPLETED | OUTPATIENT
Start: 2019-09-16 | End: 2019-09-16

## 2019-09-16 RX ORDER — ONDANSETRON 2 MG/ML
4 INJECTION INTRAMUSCULAR; INTRAVENOUS ONCE
Status: COMPLETED | OUTPATIENT
Start: 2019-09-16 | End: 2019-09-16

## 2019-09-16 RX ADMIN — PANTOPRAZOLE SODIUM 40 MG: 40 INJECTION, POWDER, FOR SOLUTION INTRAVENOUS at 23:03

## 2019-09-16 RX ADMIN — FOLIC ACID: 5 INJECTION, SOLUTION INTRAMUSCULAR; INTRAVENOUS; SUBCUTANEOUS at 23:37

## 2019-09-16 RX ADMIN — ONDANSETRON 4 MG: 2 INJECTION INTRAMUSCULAR; INTRAVENOUS at 23:01

## 2019-09-16 RX ADMIN — HYDROMORPHONE HYDROCHLORIDE 0.5 MG: 1 INJECTION, SOLUTION INTRAMUSCULAR; INTRAVENOUS; SUBCUTANEOUS at 23:26

## 2019-09-16 ASSESSMENT — MIFFLIN-ST. JEOR: SCORE: 1828.03

## 2019-09-16 NOTE — ED AVS SNAPSHOT
[facility] Emergency Department  [address] [address]  Phone:  [phone]  Fax:  [phone]                                    Ed Mckenzie   MRN: [id number]    Department:  [facility] Emergency Department   Date of Visit:  9/16/2019           After Visit Summary Signature Page    I have received my discharge instructions, and my questions have been answered. I have discussed any challenges I see with this plan with the nurse or doctor.    ..........................................................................................................................................  Patient/Patient Representative Signature      ..........................................................................................................................................  Patient Representative Print Name and Relationship to Patient    ..................................................               ................................................  Date                                   Time    ..........................................................................................................................................  Reviewed by Signature/Title    ...................................................              ..............................................  Date                                               Time          22EPIC Rev 08/18

## 2019-09-17 VITALS
WEIGHT: 185 LBS | DIASTOLIC BLOOD PRESSURE: 86 MMHG | HEART RATE: 78 BPM | RESPIRATION RATE: 14 BRPM | SYSTOLIC BLOOD PRESSURE: 134 MMHG | OXYGEN SATURATION: 96 % | HEIGHT: 73 IN | TEMPERATURE: 98 F | BODY MASS INDEX: 24.52 KG/M2

## 2019-09-17 NOTE — DISCHARGE INSTRUCTIONS
You need to stop drinking as we discussed.  Your liver is showing signs of irritation from the alcohol consumption, at this point these changes are reversible if he stopped drinking.  I would also recommend that you do not use NSAID class medications like ibuprofen with the way your stomach and esophagus are behaving.  The ibuprofen is likely making this worse.  Expect to feel quite stiff and sore over the next couple of days as it relates to your head and neck from the fall.  I would recommend that you use acetaminophen sparingly as well as alternating ice and hot pack to your neck and head for comfort as necessary.

## 2019-09-17 NOTE — ED PROVIDER NOTES
History     Chief Complaint   Patient presents with     Chest Pain     Loss of Consciousness     HPI  Ed Mckenzie is a 34 year old male, past medical history is significant for gout, pancreatitis alcohol induced, alcohol abuse, tobacco dependence, GERD, hypertension, presents to the emergency department via EMS with his wife with concerns of chest pain and loss of consciousness episode.  History is obtained from the patient and his wife.  The patient states that he awoke this morning with central nonradiating burning aching chest pain that was around a 5-6/10.  This felt somewhat similar to his pancreatitis in the past of which she has had 2 episodes the last 1 of which was 6 months ago.  Alcohol related.  He continues to drink anywhere from 5-8 shots of alcohol per day and did so yesterday while taking out his dock yesterday at the cabin.  Took ibuprofen for the pain as he does most days for a variety of joint pains and also some gout concerns in his lower extremities.  He takes anywhere from 800 2400 mg of ibuprofen a day.  His wife states that he was lying on the floor in the guest bedroom and got up to go to the bathroom, she watched this and it was like he was sleepwalking.  He went to the bathroom was bent over the sink to throw up and then he stood up and gradually fell over backwards hitting his head into the angle of the wall.  She does not think there was a complete loss of consciousness but he was very out of it.  He admits to drinking today his usual somewhere around 6-8 shots of hard alcohol this afternoon.  He identifies pain in his head and less pain in his neck but more stiffness than pain.    Allergies:  Allergies   Allergen Reactions     Penicillins Anaphylaxis       Problem List:    Patient Active Problem List    Diagnosis Date Noted     History of gout 11/27/2018     Priority: Medium     Alcohol-induced acute pancreatitis 11/27/2018     Priority: Medium     Alcohol abuse 11/27/2018      "Priority: Medium     Tobacco dependence syndrome 11/27/2018     Priority: Medium     Gastroesophageal reflux disease with esophagitis 11/27/2018     Priority: Medium     Acute pancreatitis without infection or necrosis 11/27/2018     Priority: Medium     Pancreatitis 11/27/2018     Priority: Medium     Benign essential hypertension 07/18/2018     Priority: Medium        Past Medical History:    Past Medical History:   Diagnosis Date     Acute idiopathic gout of multiple sites 10/16/2017     Acute pancreatitis without infection or necrosis 7/10/2018       Past Surgical History:    Past Surgical History:   Procedure Laterality Date     HERNIA REPAIR         Family History:    Family History   Problem Relation Age of Onset     Breast Cancer Mother      Ovarian Cancer Mother 58       Social History:  Marital Status:  Single [1]  Social History     Tobacco Use     Smoking status: Current Every Day Smoker     Packs/day: 0.50     Smokeless tobacco: Never Used     Tobacco comment: currently using lozenges   Substance Use Topics     Alcohol use: Yes     Comment: 2-3 cocktails per night     Drug use: No        Medications:      ibuprofen 200 MG capsule   metoprolol succinate (TOPROL-XL) 50 MG 24 hr tablet   Multiple Vitamin (ONE-A-DAY MENS PO)   Omeprazole Magnesium (PRILOSEC OTC PO)   oxyCODONE (ROXICODONE) 5 MG tablet   oxyCODONE (ROXICODONE) 5 MG tablet         Review of Systems   All other systems reviewed and are negative.      Physical Exam   BP: (!) 116/94  Pulse: 79  Heart Rate: 74  Temp: 98  F (36.7  C)  Resp: 8  Height: 185.4 cm (6' 1\")  Weight: 83.9 kg (185 lb)  SpO2: 99 %      Physical Exam   Constitutional: He is oriented to person, place, and time. He appears ill.   He appears uncomfortable and smells faintly of alcohol.  Oriented x3.   HENT:   Head: Normocephalic and atraumatic.   Eyes: Pupils are equal, round, and reactive to light. EOM are normal.   Neck:   Midline tenderness around C5 level   Cardiovascular: " Normal rate, regular rhythm and normal pulses.   Pulmonary/Chest: Effort normal and breath sounds normal.   Abdominal: Soft.   Musculoskeletal: Normal range of motion.   Neurological: He is alert and oriented to person, place, and time.   Skin: Skin is warm and dry. Capillary refill takes less than 2 seconds.   Nursing note and vitals reviewed.      ED Course        Procedures               Critical Care time:  none               Results for orders placed or performed during the hospital encounter of 09/16/19 (from the past 24 hour(s))   CBC with platelets differential   Result Value Ref Range    WBC 5.6 4.0 - 11.0 10e9/L    RBC Count 5.37 4.4 - 5.9 10e12/L    Hemoglobin 16.8 13.3 - 17.7 g/dL    Hematocrit 48.6 40.0 - 53.0 %    MCV 91 78 - 100 fl    MCH 31.3 26.5 - 33.0 pg    MCHC 34.6 31.5 - 36.5 g/dL    RDW 13.2 10.0 - 15.0 %    Platelet Count 295 150 - 450 10e9/L    Diff Method Automated Method     % Neutrophils 53.0 %    % Lymphocytes 33.9 %    % Monocytes 7.4 %    % Eosinophils 3.4 %    % Basophils 2.1 %    % Immature Granulocytes 0.2 %    Nucleated RBCs 0 0 /100    Absolute Neutrophil 3.0 1.6 - 8.3 10e9/L    Absolute Lymphocytes 1.9 0.8 - 5.3 10e9/L    Absolute Monocytes 0.4 0.0 - 1.3 10e9/L    Absolute Eosinophils 0.2 0.0 - 0.7 10e9/L    Absolute Basophils 0.1 0.0 - 0.2 10e9/L    Abs Immature Granulocytes 0.0 0 - 0.4 10e9/L    Absolute Nucleated RBC 0.0    Comprehensive metabolic panel   Result Value Ref Range    Sodium 142 133 - 144 mmol/L    Potassium 3.7 3.4 - 5.3 mmol/L    Chloride 106 94 - 109 mmol/L    Carbon Dioxide 22 20 - 32 mmol/L    Anion Gap 14 3 - 14 mmol/L    Glucose 94 70 - 99 mg/dL    Urea Nitrogen 7 7 - 30 mg/dL    Creatinine 0.93 0.66 - 1.25 mg/dL    GFR Estimate >90 >60 mL/min/[1.73_m2]    GFR Estimate If Black >90 >60 mL/min/[1.73_m2]    Calcium 8.7 8.5 - 10.1 mg/dL    Bilirubin Total 0.4 0.2 - 1.3 mg/dL    Albumin 3.7 3.4 - 5.0 g/dL    Protein Total 7.7 6.8 - 8.8 g/dL    Alkaline  Phosphatase 179 (H) 40 - 150 U/L     (H) 0 - 70 U/L     (H) 0 - 45 U/L   Troponin I   Result Value Ref Range    Troponin I ES <0.015 0.000 - 0.045 ug/L   Alcohol ethyl   Result Value Ref Range    Ethanol g/dL 0.28 (H) <0.01 g/dL   Lipase   Result Value Ref Range    Lipase 108 73 - 393 U/L   CT Head w/o Contrast    Narrative    EXAM: CT HEAD W/O CONTRAST, CT CERVICAL SPINE W/O CONTRAST  LOCATION: [facility]  DATE/TIME: 9/16/2019 11:15 PM    INDICATION: Fall, intoxication, neck pain  COMPARISON: None  TECHNIQUE:   HEAD CT: Without IV contrast. Multiplanar reformats. Dose reduction techniques were used.  CERVICAL SPINE CT: Routine without IV contrast. Multiplanar reformats. Dose reduction techniques were used.    FINDINGS:   HEAD CT:   INTRACRANIAL CONTENTS: No intracranial hemorrhage, extraaxial collection, or mass effect.  No CT evidence of acute infarct. Normal parenchymal attenuation. Normal ventricles and sulci.     VISUALIZED ORBITS/SINUSES/MASTOIDS: No intraorbital abnormality. No paranasal sinus mucosal disease. No middle ear or mastoid effusion.    BONES/SOFT TISSUES: No acute abnormality.    CERVICAL SPINE CT:   VERTEBRA: Normal vertebral body heights and alignment. No fracture or posttraumatic subluxation.     CANAL/FORAMINA: No canal or neural foraminal stenosis.    PARASPINAL: There is irregularity of the left paramedian thyroid cartilage which may represent the sequela of remote trauma. No extraspinal abnormality. Visualized lung fields are clear.      Impression    IMPRESSION:  HEAD CT:  1.  Normal head CT.    CERVICAL SPINE CT:  1.  No CT evidence for acute fracture or post traumatic subluxation.  2.  Irregularity of the left paramedian thyroid cartilage may represent the sequela of prior trauma. Correlate clinically.   CT Cervical Spine w/o Contrast    Narrative    EXAM: CT HEAD W/O CONTRAST, CT CERVICAL SPINE W/O CONTRAST  LOCATION: [facility]  DATE/TIME:  9/16/2019 11:15 PM    INDICATION: Fall, intoxication, neck pain  COMPARISON: None  TECHNIQUE:   HEAD CT: Without IV contrast. Multiplanar reformats. Dose reduction techniques were used.  CERVICAL SPINE CT: Routine without IV contrast. Multiplanar reformats. Dose reduction techniques were used.    FINDINGS:   HEAD CT:   INTRACRANIAL CONTENTS: No intracranial hemorrhage, extraaxial collection, or mass effect.  No CT evidence of acute infarct. Normal parenchymal attenuation. Normal ventricles and sulci.     VISUALIZED ORBITS/SINUSES/MASTOIDS: No intraorbital abnormality. No paranasal sinus mucosal disease. No middle ear or mastoid effusion.    BONES/SOFT TISSUES: No acute abnormality.    CERVICAL SPINE CT:   VERTEBRA: Normal vertebral body heights and alignment. No fracture or posttraumatic subluxation.     CANAL/FORAMINA: No canal or neural foraminal stenosis.    PARASPINAL: There is irregularity of the left paramedian thyroid cartilage which may represent the sequela of remote trauma. No extraspinal abnormality. Visualized lung fields are clear.      Impression    IMPRESSION:  HEAD CT:  1.  Normal head CT.    CERVICAL SPINE CT:  1.  No CT evidence for acute fracture or post traumatic subluxation.  2.  Irregularity of the left paramedian thyroid cartilage may represent the sequela of prior trauma. Correlate clinically.       Medications   sodium chloride 0.9 % 1,000 mL with INFUVITE ADULT 10 mL, thiamine 100 mg, folic acid 1 mg infusion ( Intravenous New Bag 9/16/19 2337)   HYDROmorphone (PF) (DILAUDID) injection 0.5 mg (0.5 mg Intravenous Given 9/16/19 2326)   ondansetron (ZOFRAN) injection 4 mg (4 mg Intravenous Given 9/16/19 2301)   pantoprazole (PROTONIX) 40 mg IV push injection (40 mg Intravenous Given 9/16/19 2303)     12:42 AM  All diagnostics are reviewed in the room with the patient and his wife and answered the questions.  I offered the patient resources for alcohol cessation as an outpatient which he  declined stated that he is already looked into this and is planning on some type of a program to stop drinking.  C-collar was removed by myself.  Assessments & Plan (with Medical Decision Making)   34-year-old male past medical history reviewed as above who presents the emergency department with a fall in the context of alcohol consumption and chest pain as discussed in the HPI.  Physical exam finds him alert and in no acute distress, cooperative with c-collar in place.  Evaluate for traumatic concerns with respect to the head and neck; acutely negative on CT with respect to traumatic findings.  Lab diagnostics are obtained given the patient's chest/epigastric area discomfort.  I think this most likely relates, at least this time, to GERD/esophagitis.  No evidence to support pancreatitis biochemically with a negative normal lipase.  His LFTs are mildly elevated consistent with his alcohol use and I think this reflects alcoholic hepatitis.  I discussed this with him.  These are mild elevations at this time and if he stops drinking will likely normalize very quickly.  I also suspect that in addition to the alcohol and acute intoxication that led to his fall he is use of NSAIDs for his gout type symptoms and a variety of other aches and pains is likely adding to his epigastric/chest discomfort in the form of GERD/esophagitis.  I recommended that he not use NSAID class medication and that he stop drinking.  I offered him resources to help him with this and he declined.  The patient is planned on following up for alcohol cessation and his wife agrees to drive him home from the emergency department today.  All questions were answered return as needed.    Disclaimer: This note consists of symbols derived from keyboarding, dictation and/or voice recognition software. As a result, there may be errors in the script that have gone undetected. Please consider this when interpreting information found in this chart.      I have  reviewed the nursing notes.    I have reviewed the findings, diagnosis, plan and need for follow up with the patient.          New Prescriptions    No medications on file       Final diagnoses:   Fall, initial encounter   Closed head injury, initial encounter   Strain of neck muscle, initial encounter   Alcoholic intoxication without complication (H)   Gastroesophageal reflux disease, esophagitis presence not specified   Alcoholic hepatitis, unspecified whether ascites present       9/16/2019   [facility] EMERGENCY DEPARTMENT     Solitario Tineo MD  09/17/19 0045

## 2019-09-17 NOTE — ED TRIAGE NOTES
"Took dock out yesterday at cabin today awoke with intermittent chest pain that has worsened throughout the day    Denies recent illness or injury    Pain worsens with palp    Pt is a smoker and regular drinker- 4-6 shots this afternoon    Had witnessed syncopal episode in bathroom wife saw him fall backwards and hit head on wall    Has neck pain pt/ems unsure if due to fall or ccollar they placed    Pt freely moving self moving head and neck     describes pain in neck \"just incredibly stiff\"  No cspine tenderness  "

## 2019-09-25 ENCOUNTER — OFFICE VISIT (OUTPATIENT)
Dept: FAMILY MEDICINE | Facility: CLINIC | Age: 34
End: 2019-09-25
Payer: COMMERCIAL

## 2019-09-25 VITALS
OXYGEN SATURATION: 98 % | DIASTOLIC BLOOD PRESSURE: 86 MMHG | SYSTOLIC BLOOD PRESSURE: 138 MMHG | WEIGHT: 174.2 LBS | RESPIRATION RATE: 12 BRPM | BODY MASS INDEX: 23.09 KG/M2 | TEMPERATURE: 98.5 F | HEIGHT: 73 IN | HEART RATE: 84 BPM

## 2019-09-25 DIAGNOSIS — M10.9 ACUTE GOUTY ARTHRITIS: Primary | ICD-10-CM

## 2019-09-25 LAB — URATE SERPL-MCNC: 7.2 MG/DL (ref 3.5–7.2)

## 2019-09-25 PROCEDURE — 84550 ASSAY OF BLOOD/URIC ACID: CPT | Performed by: FAMILY MEDICINE

## 2019-09-25 PROCEDURE — 99213 OFFICE O/P EST LOW 20 MIN: CPT | Performed by: FAMILY MEDICINE

## 2019-09-25 PROCEDURE — 36415 COLL VENOUS BLD VENIPUNCTURE: CPT | Performed by: FAMILY MEDICINE

## 2019-09-25 RX ORDER — INDOMETHACIN 25 MG/1
25 CAPSULE ORAL 2 TIMES DAILY WITH MEALS
Qty: 40 CAPSULE | Refills: 0 | Status: SHIPPED | OUTPATIENT
Start: 2019-09-25 | End: 2019-12-03

## 2019-09-25 ASSESSMENT — MIFFLIN-ST. JEOR: SCORE: 1779.05

## 2019-09-25 ASSESSMENT — PAIN SCALES - GENERAL: PAINLEVEL: EXTREME PAIN (8)

## 2019-09-25 NOTE — PATIENT INSTRUCTIONS
Take indomethacin 25 mg  1 capsules twice a day orally with food for gout flares - use for 3-5 days.  If no improvement after 5-7 days, see your doctor again or contact the care team first.    You will be contacted in 1-2 days for results of your lab tests.  Patient Education     Gout    Gout is an inflammation of a joint due to a build-up of gout crystals in the joint fluid. This occurs when there is an excess of uric acid (a normal waste product) in the body. Uric acid builds up in the body when the kidneys are unable to filter enough of it from the blood. This may occur with age. It is also associated with kidney disease. Gout occurs more often in people with obesity, diabetes, high blood pressure, or high levels of fats in the blood. It may run in families. Gout tends to come and go. A flare up of gout is called an attack. Drinking alcohol or eating certain foods (such as shellfish or foods with additives such as high-fructose corn syrup) may increase uric acid levels in the blood and cause a gout attack.  During a gout attack, the affected joint may become a hot, red, swollen and painful. If you have had one attack of gout, you are likely to have another. An attack of gout can be treated with medicine. If these attacks become frequent, a daily medicine may be prescribed to help the kidneys remove uric acid from the body.  Home care  During a gout attack:    Rest painful joints. If gout affects the joints of your foot or leg, you may want to use crutches for the first few days to keep from bearing weight on the affected joint.    When sitting or lying down, raise the painful joint to a level higher than your heart.    Apply an ice pack (ice cubes in a plastic bag wrapped in a thin towel) over the injured area for 20 minutes every 1 to 2 hours the first day for pain relief. Continue this 3 to 4 times a day for swelling and pain.    Avoid alcohol and foods listed below (see Preventing attacks) during a gout  attack. Drink extra fluid to help flush the uric acid through your kidneys.    If you were prescribed a medicine to treat gout, take it as your healthcare provider has instructed. Don't skip doses.    Take anti-inflammatory medicine as directed.     If pain medicines have been prescribed, take them exactly as directed.    Preventing attacks    Minimize or avoid alcohol use. Excess alcohol intake can cause a gout attack.    Limit these foods and beverages:  ? Organ meats, such as kidneys and liver  ? Certain seafoods (anchovies, sardines, shrimp, scallops, herring, mackerel)  ? Wild game, meat extracts and meat gravies  ? Foods and beverages sweetened with high-fructose corn syrup, such as sodas    Eat a healthy diet including low-fat and nonfat dairy, whole grains, and vegetables.    If you are overweight, talk to your healthcare provider about a weight reduction plan. Avoid fasting or extreme low calorie diets (less than 900 calories per day). This will increase uric acid levels in the body.    If you have diabetes or high blood pressure, work with your doctor to manage these conditions.    Protect the joint from injury. Trauma can trigger a gout attack.  Follow-up care  Follow up with your healthcare provider, or as advised.   When to seek medical advice  Call your healthcare provider if you have any of the following:    Fever over 100.4 F (38. C) with worsening joint pain    Increasing redness around the joint    Pain developing in another joint    Repeated vomiting, abdominal pain, or blood in the vomit or stool (black or red color)  Date Last Reviewed: 3/1/2017    7402-5537 [employer]. [address] [place] [address], [place] [address]. All rights reserved. This information is not intended as a substitute for professional medical care. Always follow your healthcare professional's instructions.           Patient Education     Gout Diet  Gout is a painful condition caused by an excess of uric acid, a waste  product made by the body. Uric acid forms crystals that collect in the joints. The immune response to these crystals brings on symptoms of joint pain and swelling. This is called a gout attack. Often, medications and diet changes are combined to manage gout. Below are some guidelines for changing your diet to help you manage gout and prevent attacks. Your healthcare provider will help you determine the best eating plan for you.  Eating to manage gout  Weight loss for those who are overweight may help reduce gout attacks.  Eat less of these foods  Eating too many foods containing purines may raise the levels of uric acid in your body. This raises your risk for a gout attack. Try to limit these foods and drinks:    Alcohol, such as beer and red wine. You may be told to avoid alcohol completely.    Soft drinks that contain sugar or high fructose corn syrup    Certain fish, including anchovies, sardines, fish eggs, and herring    Shellfish    Certain meats, such as red meat, hot dogs, luncheon meats, and turkey    Organ meats, such as liver, kidneys, and sweetbreads    Legumes, such as dried beans and peas    Other high fat foods such as gravy, whole milk, and high fat cheeses    Vegetables such as asparagus, cauliflower, spinach, and mushrooms used to be thought to contribute to an increased risk for a gout attack, but recent studies show that high purine vegetables don't increase the risk for a gout attack.  Eat more of these foods  Other foods may be helpful for people with gout. Add some of these foods to your diet:    Cherries contain chemicals that may lower uric acid.    Omega fatty acids. These are found in some fatty fish such as salmon, certain oils (flax, olive, or nut), and nuts themselves. Omega fatty acids may help prevent inflammation due to gout.    Dairy products that are low-fat or fat-free, such as cheese and yogurt    Complex carbohydrate foods, including whole grains, brown rice, oats, and  beans    Coffee, in moderation    Water, approximately 64 ounces per day  Follow-up care  Follow up with your healthcare provider as advised.  When to seek medical advice  Call your healthcare provider right away if any of these occur:    Return of gout symptoms, usually at night:    Severe pain, swelling, and heat in a joint, especially the base of the big toe    Affected joint is hard to move    Skin of the affected joint is purple or red    Fever of 100.4 F (38 C) or higher    Pain that doesn't get better even with prescribed medicine   Date Last Reviewed: 6/1/2018 2000-2018 [employer]. [address] [place], [place] [id number]. All rights reserved. This information is not intended as a substitute for professional medical care. Always follow your healthcare professional's instructions.

## 2019-09-25 NOTE — PROGRESS NOTES
Subjective     Ed Mckenzie is a 34 year old male who presents to clinic today for the following health issues:    HPI   Joint Pain    Onset: 1 week, history of gout left knee    Description:   Location: both feet  Character: Sharp with weight bearing, burning, throbbing    Intensity: 7-8/10 currently, 10/10 at worst    Progression of Symptoms: intermittent    Accompanying Signs & Symptoms:  Other symptoms: radiation of pain to ankle, tingling, weakness of legs, warmth, swelling and redness    History:   Previous similar pain: YES- does have history of gout in the left knee, does have history of recurring foot pain      Precipitating factors:   Trauma or overuse: no     Alleviating factors:  Improved by: ice and acetaminophen    Therapies Tried and outcome: ice, heat, acetaminophen - does help with the pain but not the swelling    Verified above history with patient.      Patient Active Problem List   Diagnosis     Benign essential hypertension     History of gout     Alcohol-induced acute pancreatitis     Alcohol abuse     Tobacco dependence syndrome     Gastroesophageal reflux disease with esophagitis     Acute pancreatitis without infection or necrosis     Pancreatitis     Past Surgical History:   Procedure Laterality Date     HERNIA REPAIR         Social History     Tobacco Use     Smoking status: Current Every Day Smoker     Packs/day: 0.50     Smokeless tobacco: Never Used   Substance Use Topics     Alcohol use: Not Currently     Family History   Problem Relation Age of Onset     Breast Cancer Mother      Ovarian Cancer Mother 58         Current Outpatient Medications   Medication Sig Dispense Refill     indomethacin (INDOCIN) 25 MG capsule Take 1 capsule (25 mg) by mouth 2 times daily (with meals) For 3-5 days during a gout flare. 40 capsule 0     Multiple Vitamin (ONE-A-DAY MENS PO) Take 2 chew tab by mouth daily       Omeprazole Magnesium (PRILOSEC OTC PO) Take 1 tablet by mouth daily        ibuprofen  "200 MG capsule Take 800 mg by mouth every 8 hours as needed for fever        metoprolol succinate (TOPROL-XL) 50 MG 24 hr tablet Take 1 tablet (50 mg) by mouth daily (Patient not taking: Reported on 9/25/2019) 90 tablet 1     Allergies   Allergen Reactions     Penicillins Anaphylaxis       Reviewed and updated as needed this visit by Provider  Tobacco  Allergies  Meds  Problems  Med Hx  Surg Hx  Fam Hx         Review of Systems   C: NEGATIVE for fever, chills, change in weight  I: NEGATIVE for worrisome rashes, moles or lesions  MUSCULOSKELETAL:see above  N: NEGATIVE for weakness, dizziness or paresthesias  H: NEGATIVE for bleeding problems      Objective    /86   Pulse 84   Temp 98.5  F (36.9  C) (Tympanic)   Resp 12   Ht 1.854 m (6' 1\")   Wt 79 kg (174 lb 3.2 oz)   SpO2 98%   BMI 22.98 kg/m    Body mass index is 22.98 kg/m .  Physical Exam   GEN: alert, oriented x 3, NAD, slight antalgia noted.  RIGHT FOOT/ ANKLE: moderate swelling of mid to distal foot and mild fist MTP erythema; moderate TTP of lateral ankle and mild TTP of first MTP; painful range of motion of ankle all diractions; non-painful range of motion of toes  LEFT FOOT/ ANKLE: no deformity/discoloration/swelling; mild lateral ankle TTP; mildly painful ankle range of motion        Diagnostic Test Results:  Results for orders placed or performed in visit on 09/25/19   Uric acid   Result Value Ref Range    Uric Acid 7.2 3.5 - 7.2 mg/dL           Assessment & Plan     Ed was seen today for musculoskeletal problem and flu shot.    Diagnoses and all orders for this visit:    Acute gouty arthritis  -     Uric acid  -     indomethacin (INDOCIN) 25 MG capsule; Take 1 capsule (25 mg) by mouth 2 times daily (with meals) For 3-5 days during a gout flare.    With hx of hyperuricemia, and character of pain, consistent with gout.  Discussed use of indomethacin, including its safe administration.  Reinforced dietary precautions to prevent " attacks.  Push oral fluids.  Activity as tolerated.  Consider allopurinol if frequent attacks.  Return precautions discussed and given to patient.           Patient Instructions   Take indomethacin 25 mg  1 capsules twice a day orally with food for gout flares - use for 3-5 days.  If no improvement after 5-7 days, see your doctor again or contact the care team first.    You will be contacted in 1-2 days for results of your lab tests.  Patient Education     Gout    Gout is an inflammation of a joint due to a build-up of gout crystals in the joint fluid. This occurs when there is an excess of uric acid (a normal waste product) in the body. Uric acid builds up in the body when the kidneys are unable to filter enough of it from the blood. This may occur with age. It is also associated with kidney disease. Gout occurs more often in people with obesity, diabetes, high blood pressure, or high levels of fats in the blood. It may run in families. Gout tends to come and go. A flare up of gout is called an attack. Drinking alcohol or eating certain foods (such as shellfish or foods with additives such as high-fructose corn syrup) may increase uric acid levels in the blood and cause a gout attack.  During a gout attack, the affected joint may become a hot, red, swollen and painful. If you have had one attack of gout, you are likely to have another. An attack of gout can be treated with medicine. If these attacks become frequent, a daily medicine may be prescribed to help the kidneys remove uric acid from the body.  Home care  During a gout attack:    Rest painful joints. If gout affects the joints of your foot or leg, you may want to use crutches for the first few days to keep from bearing weight on the affected joint.    When sitting or lying down, raise the painful joint to a level higher than your heart.    Apply an ice pack (ice cubes in a plastic bag wrapped in a thin towel) over the injured area for 20 minutes every 1 to  2 hours the first day for pain relief. Continue this 3 to 4 times a day for swelling and pain.    Avoid alcohol and foods listed below (see Preventing attacks) during a gout attack. Drink extra fluid to help flush the uric acid through your kidneys.    If you were prescribed a medicine to treat gout, take it as your healthcare provider has instructed. Don't skip doses.    Take anti-inflammatory medicine as directed.     If pain medicines have been prescribed, take them exactly as directed.    Preventing attacks    Minimize or avoid alcohol use. Excess alcohol intake can cause a gout attack.    Limit these foods and beverages:  ? Organ meats, such as kidneys and liver  ? Certain seafoods (anchovies, sardines, shrimp, scallops, herring, mackerel)  ? Wild game, meat extracts and meat gravies  ? Foods and beverages sweetened with high-fructose corn syrup, such as sodas    Eat a healthy diet including low-fat and nonfat dairy, whole grains, and vegetables.    If you are overweight, talk to your healthcare provider about a weight reduction plan. Avoid fasting or extreme low calorie diets (less than 900 calories per day). This will increase uric acid levels in the body.    If you have diabetes or high blood pressure, work with your doctor to manage these conditions.    Protect the joint from injury. Trauma can trigger a gout attack.  Follow-up care  Follow up with your healthcare provider, or as advised.   When to seek medical advice  Call your healthcare provider if you have any of the following:    Fever over 100.4 F (38. C) with worsening joint pain    Increasing redness around the joint    Pain developing in another joint    Repeated vomiting, abdominal pain, or blood in the vomit or stool (black or red color)  Date Last Reviewed: 3/1/2017    8879-2412 [employer]. [address] [place] [id number]. All rights reserved. This information is not intended as a substitute for professional medical  care. Always follow your healthcare professional's instructions.           Patient Education     Gout Diet  Gout is a painful condition caused by an excess of uric acid, a waste product made by the body. Uric acid forms crystals that collect in the joints. The immune response to these crystals brings on symptoms of joint pain and swelling. This is called a gout attack. Often, medications and diet changes are combined to manage gout. Below are some guidelines for changing your diet to help you manage gout and prevent attacks. Your healthcare provider will help you determine the best eating plan for you.  Eating to manage gout  Weight loss for those who are overweight may help reduce gout attacks.  Eat less of these foods  Eating too many foods containing purines may raise the levels of uric acid in your body. This raises your risk for a gout attack. Try to limit these foods and drinks:    Alcohol, such as beer and red wine. You may be told to avoid alcohol completely.    Soft drinks that contain sugar or high fructose corn syrup    Certain fish, including anchovies, sardines, fish eggs, and herring    Shellfish    Certain meats, such as red meat, hot dogs, luncheon meats, and turkey    Organ meats, such as liver, kidneys, and sweetbreads    Legumes, such as dried beans and peas    Other high fat foods such as gravy, whole milk, and high fat cheeses    Vegetables such as asparagus, cauliflower, spinach, and mushrooms used to be thought to contribute to an increased risk for a gout attack, but recent studies show that high purine vegetables don't increase the risk for a gout attack.  Eat more of these foods  Other foods may be helpful for people with gout. Add some of these foods to your diet:    Cherries contain chemicals that may lower uric acid.    Omega fatty acids. These are found in some fatty fish such as salmon, certain oils (flax, olive, or nut), and nuts themselves. Omega fatty acids may help prevent  inflammation due to gout.    Dairy products that are low-fat or fat-free, such as cheese and yogurt    Complex carbohydrate foods, including whole grains, brown rice, oats, and beans    Coffee, in moderation    Water, approximately 64 ounces per day  Follow-up care  Follow up with your healthcare provider as advised.  When to seek medical advice  Call your healthcare provider right away if any of these occur:    Return of gout symptoms, usually at night:    Severe pain, swelling, and heat in a joint, especially the base of the big toe    Affected joint is hard to move    Skin of the affected joint is purple or red    Fever of 100.4 F (38 C) or higher    Pain that doesn't get better even with prescribed medicine   Date Last Reviewed: 6/1/2018 2000-2018 [employer]. [address] [place] [address], [place] [address]. All rights reserved. This information is not intended as a substitute for professional medical care. Always follow your healthcare professional's instructions.               Return in about 1 week (around 10/2/2019) for If condition does not improve.    Santiago Dover MD  [facility]

## 2019-12-03 ENCOUNTER — OFFICE VISIT (OUTPATIENT)
Dept: FAMILY MEDICINE | Facility: CLINIC | Age: 34
End: 2019-12-03
Payer: COMMERCIAL

## 2019-12-03 VITALS
TEMPERATURE: 97.8 F | BODY MASS INDEX: 23.33 KG/M2 | WEIGHT: 176 LBS | DIASTOLIC BLOOD PRESSURE: 102 MMHG | RESPIRATION RATE: 16 BRPM | HEART RATE: 75 BPM | HEIGHT: 73 IN | OXYGEN SATURATION: 97 % | SYSTOLIC BLOOD PRESSURE: 142 MMHG

## 2019-12-03 DIAGNOSIS — F10.10 ALCOHOL ABUSE: ICD-10-CM

## 2019-12-03 DIAGNOSIS — M10.9 ACUTE GOUTY ARTHRITIS: Primary | ICD-10-CM

## 2019-12-03 DIAGNOSIS — Z23 NEED FOR PROPHYLACTIC VACCINATION AND INOCULATION AGAINST INFLUENZA: ICD-10-CM

## 2019-12-03 PROCEDURE — 90471 IMMUNIZATION ADMIN: CPT | Performed by: NURSE PRACTITIONER

## 2019-12-03 PROCEDURE — 99215 OFFICE O/P EST HI 40 MIN: CPT | Mod: 25 | Performed by: NURSE PRACTITIONER

## 2019-12-03 PROCEDURE — 90686 IIV4 VACC NO PRSV 0.5 ML IM: CPT | Performed by: NURSE PRACTITIONER

## 2019-12-03 RX ORDER — INDOMETHACIN 50 MG/1
50 CAPSULE ORAL 2 TIMES DAILY WITH MEALS
Qty: 10 CAPSULE | Refills: 3 | Status: SHIPPED | OUTPATIENT
Start: 2019-12-03 | End: 2022-06-21

## 2019-12-03 RX ORDER — ALLOPURINOL 100 MG/1
100 TABLET ORAL DAILY
Qty: 30 TABLET | Refills: 1 | Status: SHIPPED | OUTPATIENT
Start: 2019-12-03 | End: 2020-01-09

## 2019-12-03 ASSESSMENT — MIFFLIN-ST. JEOR: SCORE: 1787.21

## 2019-12-03 NOTE — PATIENT INSTRUCTIONS
Indomethacin 50 mg twice daily until symptoms fully resolve.  Allopurinol 100 mg once daily after the flare resolve.    Recheck Uric acid levels and liver testing in 1 month.    Referral Rheumatology - call to schedule.    Follow up in clinic in 1-2 months.    KRIS Milian        Thank you for choosing [facility].  You may be receiving an email and/or telephone survey request from [facility] Customer Experience regarding your visit today.  Please take a few minutes to respond to the survey to let us know how we are doing.      If you have questions or concerns, please contact us via [employer] or you can contact your care team at [phone].    Our Clinic hours are:  Monday 6:40 am  to 7:00 pm  Tuesday -Friday 6:40 am to 5:00 pm    The [place] outpatient lab hours are:  Monday - Friday 6:10 am to 4:45 pm  Saturdays 7:00 am to 11:00 am  Appointments are required, call [phone]    If you have clinical questions after hours or would like to schedule an appointment,  call the clinic at [phone].    Patient Education     Treating Gout Attacks     Raising the joint above the level of your heart can help reduce gout symptoms.     Gout is a disease that affects the joints. It is caused by excess uric acid in your blood that may lead to crystals forming in your joints. Left untreated, it can lead to painful foot and joint deformities and even kidney problems. But, by treating gout early, you can relieve pain and help prevent future problems. Gout can usually be treated with medicine and proper diet. In severe cases, surgery may be needed.  Gout attacks are painful and often happen more than once. Taking medicines may reduce pain and prevent attacks in the future. There are also some things you can do at home to relieve symptoms.  Medicines for gout  Your healthcare provider may prescribe a daily medicine to reduce levels of uric acid. Reducing your uric acid levels may help prevent gout attacks.  Allopurinol is one commonly used medicine taken daily to reduce uric acid levels. Other daily medicines used to reduce uric acid levels include febuxostat, lesinurad, and probencid. Other medicines can help relieve pain and swelling during an acute attack. Medicines such as NSAIDs (nonsteroidal anti-inflammatory medicines), steroids, and colchicine may be prescribed for intermittent use to relieve an acute gout attack. Be sure to take your medicine as directed.  What you can do  Below are some things you can do at home to relieve gout symptoms. Your healthcare provider may have other tips.    Rest the painful joint as much as you can.    Raise the painful joint so it is at a level higher than your heart.    Use ice for 10 minutes every 1 to 2 hours as possible.  How can I prevent gout?  With a little effort, you may be able to prevent gout attacks in the future. Here are some things you can do:    Don't eat foods high in purines  ? Certain meats (red meat, processed meat, turkey)  ? Organ meats (kidney, liver, sweetbread)  ? Shellfish (lobster, crab, shrimp, scallop, mussel)  ? Certain fish (anchovy, sardine, herring, mackerel)    Take any medicines prescribed by your healthcare provider.    Lose weight if you need to.    Reduce high fructose corn syrup in meals and drinks.    Reduce or cut out alcohol, particularly beer, but also red wine and spirits.    Control blood pressure, diabetes, and cholesterol.    Drink plenty of water to help flush uric acid from your body.  Date Last Reviewed: 4/1/2018 2000-2018 [employer]. [address] [place] [address], [place] [address]. All rights reserved. This information is not intended as a substitute for professional medical care. Always follow your healthcare professional's instructions.           Patient Education     Gout Diet  Gout is a painful condition caused by an excess of uric acid, a waste product made by the body. Uric acid forms crystals that collect in  the joints. The immune response to these crystals brings on symptoms of joint pain and swelling. This is called a gout attack. Often, medications and diet changes are combined to manage gout. Below are some guidelines for changing your diet to help you manage gout and prevent attacks. Your healthcare provider will help you determine the best eating plan for you.  Eating to manage gout  Weight loss for those who are overweight may help reduce gout attacks.  Eat less of these foods  Eating too many foods containing purines may raise the levels of uric acid in your body. This raises your risk for a gout attack. Try to limit these foods and drinks:    Alcohol, such as beer and red wine. You may be told to avoid alcohol completely.    Soft drinks that contain sugar or high fructose corn syrup    Certain fish, including anchovies, sardines, fish eggs, and herring    Shellfish    Certain meats, such as red meat, hot dogs, luncheon meats, and turkey    Organ meats, such as liver, kidneys, and sweetbreads    Legumes, such as dried beans and peas    Other high fat foods such as gravy, whole milk, and high fat cheeses    Vegetables such as asparagus, cauliflower, spinach, and mushrooms used to be thought to contribute to an increased risk for a gout attack, but recent studies show that high purine vegetables don't increase the risk for a gout attack.  Eat more of these foods  Other foods may be helpful for people with gout. Add some of these foods to your diet:    Cherries contain chemicals that may lower uric acid.    Omega fatty acids. These are found in some fatty fish such as salmon, certain oils (flax, olive, or nut), and nuts themselves. Omega fatty acids may help prevent inflammation due to gout.    Dairy products that are low-fat or fat-free, such as cheese and yogurt    Complex carbohydrate foods, including whole grains, brown rice, oats, and beans    Coffee, in moderation    Water, approximately 64 ounces per  day  Follow-up care  Follow up with your healthcare provider as advised.  When to seek medical advice  Call your healthcare provider right away if any of these occur:    Return of gout symptoms, usually at night:    Severe pain, swelling, and heat in a joint, especially the base of the big toe    Affected joint is hard to move    Skin of the affected joint is purple or red    Fever of 100.4 F (38 C) or higher    Pain that doesn't get better even with prescribed medicine   Date Last Reviewed: 6/1/2018 2000-2018 [employer]. [address] [place], [place] [id number]. All rights reserved. This information is not intended as a substitute for professional medical care. Always follow your healthcare professional's instructions.

## 2019-12-03 NOTE — PROGRESS NOTES
Subjective     Ed Mckenzie is a 34 year old male who presents to clinic today for the following health issues:  Chief Complaint   Patient presents with     Arthritis     Gout Flare Up,Has been getting these frequently , would like to discuss getting a rx for a preventative to take for this .      Imm/Inj     Flu Shot     Medication Reconciliation     patient states he has not taken Metoprolol in 1 + yr, ran out and never refilled       Gout/ single inflamed joint   Onset: Ongoing since 9/25/19    Description:   Location:Left  wrist , Left Thumb   Joint Swelling: YES  Redness: YES  Pain: YES    Intensity: moderate to severe    Progression of Symptoms:  worsening    Accompanying Signs & Symptoms:  Fevers: no     History:   Trauma to the area: no   Previous history of gout: no    Recent illness:  no     Precipitating factors:   Diet-rich in purine: red meat  Alcohol use: YES- - but has cut down   Diuretic use: no     Alleviating factors:  Hot and Cold     Therapies Tried and outcome: ice and heat - temporary relief     Has been in clinic multiple times for this in the past.  Reports new flare in wrist and moving into thumb.  Mostly gets it in his ankles and hands/wrist.    Was seen in clinic by Dr. Dover and given Indomethacin and took this since beginning Oct and after 1-2 weeks noticing some improvement.    History of alcohol abuse - was in ED for alcohol induced pancreatitis in 2018  With last clinic visit 1 month ago - blood alcohol level was 0.28.    Just drinking weekends - reports Vodka/Bacardi.  Stopped drinking after last clinic visit.      Patient Active Problem List   Diagnosis     Benign essential hypertension     History of gout     Alcohol-induced acute pancreatitis     Alcohol abuse     Tobacco dependence syndrome     Gastroesophageal reflux disease with esophagitis     Acute pancreatitis without infection or necrosis     Pancreatitis     Past Surgical History:   Procedure Laterality Date     HERNIA  REPAIR         Social History     Tobacco Use     Smoking status: Current Every Day Smoker     Packs/day: 0.50     Smokeless tobacco: Never Used   Substance Use Topics     Alcohol use: Yes     Comment: twice a week     Family History   Problem Relation Age of Onset     Breast Cancer Mother      Ovarian Cancer Mother 58         Current Outpatient Medications   Medication Sig Dispense Refill     Omeprazole Magnesium (PRILOSEC OTC PO) Take 1 tablet by mouth daily        OVER-THE-COUNTER Uricel  - 2 dropper fulls daily mixed with water for gout       ibuprofen 200 MG capsule Take 800 mg by mouth every 8 hours as needed for fever        metoprolol succinate (TOPROL-XL) 50 MG 24 hr tablet Take 1 tablet (50 mg) by mouth daily (Patient not taking: Reported on 9/25/2019) 90 tablet 1     Multiple Vitamin (ONE-A-DAY MENS PO) Take 2 chew tab by mouth daily       Allergies   Allergen Reactions     Penicillins Anaphylaxis     Recent Labs   Lab Test 09/16/19  2217 11/28/18  0631 11/27/18  1618 07/18/18  0940  07/10/18  1550   LDL  --   --   --   --   --  115*   HDL  --   --   --   --   --  63   TRIG  --   --   --   --   --  193*   * 32 56 68   < > 102*   CR 0.93 0.85 1.07 1.03   < > 1.09   GFRESTIMATED >90 >90 79 83   < > 77   GFRESTBLACK >90 >90 >90 >90   < > >90   POTASSIUM 3.7 3.9 3.6 4.0   < > 3.3*   TSH  --   --   --  3.10  --   --     < > = values in this interval not displayed.      BP Readings from Last 3 Encounters:   12/03/19 (!) 142/102   09/25/19 138/86   09/17/19 134/86    Wt Readings from Last 3 Encounters:   12/03/19 79.8 kg (176 lb)   09/25/19 79 kg (174 lb 3.2 oz)   09/16/19 83.9 kg (185 lb)                    Reviewed and updated as needed this visit by Provider         Review of Systems   ROS COMP: Constitutional, HEENT, cardiovascular, pulmonary, GI, , musculoskeletal, neuro, skin, endocrine and psych systems are negative, except as otherwise noted.      Objective    BP (!) 142/102 (BP Location: Right  "arm, Patient Position: Chair, Cuff Size: Adult Regular)   Pulse 75   Temp 97.8  F (36.6  C) (Tympanic)   Resp 16   Ht 1.854 m (6' 1\")   Wt 79.8 kg (176 lb)   SpO2 97%   BMI 23.22 kg/m    Body mass index is 23.22 kg/m .  Physical Exam   GENERAL: healthy, alert and no distress  NECK: no adenopathy, no asymmetry, masses, or scars and thyroid normal to palpation  RESP: lungs clear to auscultation - no rales, rhonchi or wheezes  CV: regular rate and rhythm, normal S1 S2, no S3 or S4, no murmur, click or rub, no peripheral edema and peripheral pulses strong  ABDOMEN: soft, nontender, no hepatosplenomegaly, no masses and bowel sounds normal  MS: Left wrist with redness and swelling and moderate tenderness on exam    Diagnostic Test Results:  Labs reviewed in Epic  No results found for any visits on 12/03/19.        Assessment & Plan     2. Acute gouty arthritis  Ongoing - multiple joints   Delayed response to NSAIDs  Most likely due to alcohol use/abuse - discussed prevention and risks roday  Referral to Rheum given reccurrence and mult joints       - indomethacin (INDOCIN) 50 MG capsule; Take 1 capsule (50 mg) by mouth 2 times daily (with meals) for 5 days  Dispense: 10 capsule; Refill: 3  - allopurinol (ZYLOPRIM) 100 MG tablet; Take 1 tablet (100 mg) by mouth daily  Dispense: 30 tablet; Refill: 1  - Uric acid; Future  - RHEUMATOLOGY REFERRAL  - Hepatic panel; Future    3. Alcohol abuse  History of pancreatitis, elevated BAL at last clinic visit  Discussed cessation today  Patient understanding     - indomethacin (INDOCIN) 50 MG capsule; Take 1 capsule (50 mg) by mouth 2 times daily (with meals) for 5 days  Dispense: 10 capsule; Refill: 3  - allopurinol (ZYLOPRIM) 100 MG tablet; Take 1 tablet (100 mg) by mouth daily  Dispense: 30 tablet; Refill: 1  - Uric acid; Future  - RHEUMATOLOGY REFERRAL  - Hepatic panel; Future     Tobacco Cessation:   reports that he has been smoking. He has been smoking about 0.50 packs per " day. He has never used smokeless tobacco.  Tobacco Cessation Action Plan: Information offered: Patient not interested at this time    Total times spent with patient 40 minutes of which > 50% of the time was spent counseling and coordination of care discussion of alcohol abuse, gout, meds - risks, labs, follow up and treatment plan.        Patient Instructions     Indomethacin 50 mg twice daily until symptoms fully resolve.  Allopurinol 100 mg once daily after the flare resolve.    Recheck Uric acid levels and liver testing in 1 month.    Referral Rheumatology - call to schedule.    Follow up in clinic in 1-2 months.    KRIS Milian        Thank you for choosing [facility].  You may be receiving an email and/or telephone survey request from [facility] Customer Experience regarding your visit today.  Please take a few minutes to respond to the survey to let us know how we are doing.      If you have questions or concerns, please contact us via [employer] or you can contact your care team at [phone].    Our Clinic hours are:  Monday 6:40 am  to 7:00 pm  Tuesday -Friday 6:40 am to 5:00 pm    The [place] outpatient lab hours are:  Monday - Friday 6:10 am to 4:45 pm  Saturdays 7:00 am to 11:00 am  Appointments are required, call [phone]    If you have clinical questions after hours or would like to schedule an appointment,  call the clinic at [phone].    Patient Education     Treating Gout Attacks     Raising the joint above the level of your heart can help reduce gout symptoms.     Gout is a disease that affects the joints. It is caused by excess uric acid in your blood that may lead to crystals forming in your joints. Left untreated, it can lead to painful foot and joint deformities and even kidney problems. But, by treating gout early, you can relieve pain and help prevent future problems. Gout can usually be treated with medicine and proper diet. In severe cases, surgery may be needed.  Gout  attacks are painful and often happen more than once. Taking medicines may reduce pain and prevent attacks in the future. There are also some things you can do at home to relieve symptoms.  Medicines for gout  Your healthcare provider may prescribe a daily medicine to reduce levels of uric acid. Reducing your uric acid levels may help prevent gout attacks. Allopurinol is one commonly used medicine taken daily to reduce uric acid levels. Other daily medicines used to reduce uric acid levels include febuxostat, lesinurad, and probencid. Other medicines can help relieve pain and swelling during an acute attack. Medicines such as NSAIDs (nonsteroidal anti-inflammatory medicines), steroids, and colchicine may be prescribed for intermittent use to relieve an acute gout attack. Be sure to take your medicine as directed.  What you can do  Below are some things you can do at home to relieve gout symptoms. Your healthcare provider may have other tips.    Rest the painful joint as much as you can.    Raise the painful joint so it is at a level higher than your heart.    Use ice for 10 minutes every 1 to 2 hours as possible.  How can I prevent gout?  With a little effort, you may be able to prevent gout attacks in the future. Here are some things you can do:    Don't eat foods high in purines  ? Certain meats (red meat, processed meat, turkey)  ? Organ meats (kidney, liver, sweetbread)  ? Shellfish (lobster, crab, shrimp, scallop, mussel)  ? Certain fish (anchovy, sardine, herring, mackerel)    Take any medicines prescribed by your healthcare provider.    Lose weight if you need to.    Reduce high fructose corn syrup in meals and drinks.    Reduce or cut out alcohol, particularly beer, but also red wine and spirits.    Control blood pressure, diabetes, and cholesterol.    Drink plenty of water to help flush uric acid from your body.  Date Last Reviewed: 4/1/2018 2000-2018 [employer]. [address] [facility] [facility],  [place] [address]. All rights reserved. This information is not intended as a substitute for professional medical care. Always follow your healthcare professional's instructions.           Patient Education     Gout Diet  Gout is a painful condition caused by an excess of uric acid, a waste product made by the body. Uric acid forms crystals that collect in the joints. The immune response to these crystals brings on symptoms of joint pain and swelling. This is called a gout attack. Often, medications and diet changes are combined to manage gout. Below are some guidelines for changing your diet to help you manage gout and prevent attacks. Your healthcare provider will help you determine the best eating plan for you.  Eating to manage gout  Weight loss for those who are overweight may help reduce gout attacks.  Eat less of these foods  Eating too many foods containing purines may raise the levels of uric acid in your body. This raises your risk for a gout attack. Try to limit these foods and drinks:    Alcohol, such as beer and red wine. You may be told to avoid alcohol completely.    Soft drinks that contain sugar or high fructose corn syrup    Certain fish, including anchovies, sardines, fish eggs, and herring    Shellfish    Certain meats, such as red meat, hot dogs, luncheon meats, and turkey    Organ meats, such as liver, kidneys, and sweetbreads    Legumes, such as dried beans and peas    Other high fat foods such as gravy, whole milk, and high fat cheeses    Vegetables such as asparagus, cauliflower, spinach, and mushrooms used to be thought to contribute to an increased risk for a gout attack, but recent studies show that high purine vegetables don't increase the risk for a gout attack.  Eat more of these foods  Other foods may be helpful for people with gout. Add some of these foods to your diet:    Cherries contain chemicals that may lower uric acid.    Omega fatty acids. These are found in some fatty fish  such as salmon, certain oils (flax, olive, or nut), and nuts themselves. Omega fatty acids may help prevent inflammation due to gout.    Dairy products that are low-fat or fat-free, such as cheese and yogurt    Complex carbohydrate foods, including whole grains, brown rice, oats, and beans    Coffee, in moderation    Water, approximately 64 ounces per day  Follow-up care  Follow up with your healthcare provider as advised.  When to seek medical advice  Call your healthcare provider right away if any of these occur:    Return of gout symptoms, usually at night:    Severe pain, swelling, and heat in a joint, especially the base of the big toe    Affected joint is hard to move    Skin of the affected joint is purple or red    Fever of 100.4 F (38 C) or higher    Pain that doesn't get better even with prescribed medicine   Date Last Reviewed: 6/1/2018 2000-2018 [employer]. [address] [place] [address], [place] [address]. All rights reserved. This information is not intended as a substitute for professional medical care. Always follow your healthcare professional's instructions.               No follow-ups on file.    Chelle Rashid NP  [facility]

## 2019-12-17 ENCOUNTER — TELEPHONE (OUTPATIENT)
Dept: RHEUMATOLOGY | Facility: CLINIC | Age: 34
End: 2019-12-17

## 2019-12-17 NOTE — LETTER
January 8, 2020    Ed Mckenzie  [address] [address] [facility] [address]    Dear Referring Provider,  Thank you for the referral. After careful review by the Rheumatologist, your patient does not meet the criteria for an appointment. We encourage you to refer your patient to one of our [facility] sites:    [facility]    Provider of your choice  Thank you for your support and understanding.    Sincerely,  The Division of Arthritis and Autoimmune Disorders

## 2019-12-17 NOTE — TELEPHONE ENCOUNTER
Health Call Center    Phone Message    May a detailed message be left on voicemail: yes    Reason for Call: Other: DX: Acute gouty arthritis, Alcohol abuse, and referred by Chelle Rashid, per patient.  All records in [facility], per patient.  Please follow up with patient for Intake Process.  Thank you!     Action Taken: Message routed to:  [facility]

## 2020-01-08 NOTE — TELEPHONE ENCOUNTER
Rheumatologist has reviewed chart and has made the determination that the patient does not meet the criteria for an appointment.  A letter has been sent the referring encouraging them to send the patient to one of our community sites. Patient needs to follow up with their referring or PCP for further direction.     Sun Grossman CMA   1/8/2020 10:01 AM

## 2020-01-09 ENCOUNTER — OFFICE VISIT (OUTPATIENT)
Dept: FAMILY MEDICINE | Facility: CLINIC | Age: 35
End: 2020-01-09
Payer: COMMERCIAL

## 2020-01-09 VITALS
OXYGEN SATURATION: 99 % | TEMPERATURE: 98.8 F | BODY MASS INDEX: 22.35 KG/M2 | WEIGHT: 168.6 LBS | HEIGHT: 73 IN | RESPIRATION RATE: 14 BRPM | HEART RATE: 102 BPM | SYSTOLIC BLOOD PRESSURE: 124 MMHG | DIASTOLIC BLOOD PRESSURE: 88 MMHG

## 2020-01-09 DIAGNOSIS — F10.10 ALCOHOL ABUSE: ICD-10-CM

## 2020-01-09 DIAGNOSIS — M10.9 ACUTE GOUTY ARTHROPATHY: ICD-10-CM

## 2020-01-09 DIAGNOSIS — M10.9 ACUTE GOUTY ARTHRITIS: ICD-10-CM

## 2020-01-09 DIAGNOSIS — M25.562 ACUTE PAIN OF LEFT KNEE: ICD-10-CM

## 2020-01-09 DIAGNOSIS — M10.09 ACUTE IDIOPATHIC GOUT OF MULTIPLE SITES: Primary | ICD-10-CM

## 2020-01-09 LAB
ALBUMIN SERPL-MCNC: 3.6 G/DL (ref 3.4–5)
ALP SERPL-CCNC: 133 U/L (ref 40–150)
ALT SERPL W P-5'-P-CCNC: 22 U/L (ref 0–70)
AST SERPL W P-5'-P-CCNC: 20 U/L (ref 0–45)
BILIRUB DIRECT SERPL-MCNC: <0.1 MG/DL (ref 0–0.2)
BILIRUB SERPL-MCNC: 0.2 MG/DL (ref 0.2–1.3)
CRP SERPL-MCNC: 33.1 MG/L (ref 0–8)
PROT SERPL-MCNC: 8.3 G/DL (ref 6.8–8.8)
URATE SERPL-MCNC: 6.2 MG/DL (ref 3.5–7.2)

## 2020-01-09 PROCEDURE — 80076 HEPATIC FUNCTION PANEL: CPT | Performed by: NURSE PRACTITIONER

## 2020-01-09 PROCEDURE — 99214 OFFICE O/P EST MOD 30 MIN: CPT | Performed by: NURSE PRACTITIONER

## 2020-01-09 PROCEDURE — 36415 COLL VENOUS BLD VENIPUNCTURE: CPT | Performed by: NURSE PRACTITIONER

## 2020-01-09 PROCEDURE — 84550 ASSAY OF BLOOD/URIC ACID: CPT | Performed by: NURSE PRACTITIONER

## 2020-01-09 PROCEDURE — 86140 C-REACTIVE PROTEIN: CPT | Performed by: NURSE PRACTITIONER

## 2020-01-09 RX ORDER — ALLOPURINOL 100 MG/1
100 TABLET ORAL DAILY
Qty: 180 TABLET | Refills: 3 | Status: SHIPPED | OUTPATIENT
Start: 2020-01-09 | End: 2023-03-09

## 2020-01-09 RX ORDER — PREDNISONE 20 MG/1
TABLET ORAL
Qty: 15 TABLET | Refills: 0 | Status: SHIPPED | OUTPATIENT
Start: 2020-01-09 | End: 2020-01-20

## 2020-01-09 ASSESSMENT — MIFFLIN-ST. JEOR: SCORE: 1753.64

## 2020-01-09 NOTE — PATIENT INSTRUCTIONS
Patient Education     Knee Pain  Knee pain is very common. It s especially common in active people who put a lot of pressure on their knees, like runners. It affects women more often than men.  Your kneecap (patella) is a thick, round bone. It covers and protects the front portion of your knee joint. It moves along a groove in your thighbone (femur) as part of the patellofemoral joint. A layer of cartilage surrounds the underside of your kneecap. This layer protects it from grinding against your femur.  When this cartilage softens and breaks down, it can cause knee pain. This is partly because of repetitive stress. The stress irritates the lining of the joint. This causes pain in the underlying bone.  What causes knee pain?  Many things can cause knee pain. You may have more than one cause. Some of these include:    Overuse of the knee joint    The kneecap doesn t line up with the tissue around it    Damage to small nerves in the area    Damage to the ligament-like structure that holds the kneecap in place (retinaculum)    Breakdown of the bone under the cartilage    Swelling in the soft tissues around the kneecap    Injury  You might be more likely to have knee pain if you:    Exercise a lot    Recently increased the intensity of your workouts    Have a body mass index (BMI) greater than 25    Have poor alignment of your kneecap    Walk with your feet turned overly outward or inward    Have weakness in surrounding muscle groups (inner quad or hip adductor muscles)    Have too much tightness in surrounding muscle groups (hamstrings or iliotibial band)    Have a recent history of injury to the area    Are female  Symptoms of knee pain  This type of knee pain is a dull, aching pain in the front of the knee in the area under and around the kneecap. This pain may start quickly or slowly. Your pain might be worse when you squat, run, or sit for a long time. Stairclimbing may be painful or difficult. You might also  sometimes feel like your knee is giving out. You may have symptoms in one or both of your knees.  Diagnosing knee pain  Your healthcare provider will ask about your medical history and your symptoms. Be sure to describe any activities that make your knee pain worse. He or she will look at your knee. This will include tests of your range of motion, strength, and areas of pain of your knee. Your knee alignment will be checked.  Your healthcare provider will need to rule out other causes of your knee pain, such as arthritis. You may need an imaging test, such as an X-ray or MRI.  Treatment for knee pain  Treatments that can help ease your symptoms may include:    Avoiding activities for a while that make your pain worse, returning to activity over time    Icing the outside of your knee when it causes you pain    Taking over-the-counter pain medicine    Wearing a knee brace or taping your knee to support it    Wearing special shoe inserts to help keep your feet in the proper alignment    Doing special exercises to stretch and strengthen the muscles around your hip and your knee  These steps help most people manage knee pain. But some cases of knee pain need to be treated with surgery. You rarely need surgery right away. You may need it later if other treatments don t work. Your healthcare provider may refer you to an orthopedic surgeon. He or she will talk with you about your choices.  Preventing knee pain  Losing weight and correcting excess muscle tightness or muscle weakness may help lower your risk.  In some cases, you can prevent knee pain. To help prevent a flare-up of knee pain, do these things:    Regularly do all the exercises your doctor or physical therapist advises    Support your knee as advised by your doctor or physical therapist    Increase training gradually, and ease up on training when needed    Have an expert check your gait for running or other sporting activities    Stretch properly before and  after exercise    Replace your running shoes regularly    Lose excess weight  When to call your healthcare provider  Call your healthcare provider right away if:    Your symptoms don t get better after a few weeks of treatment    You have any new symptoms  Date Last Reviewed: 4/1/2017 2000-2019 [employer]. [address] [place] [id number]. All rights reserved. This information is not intended as a substitute for professional medical care. Always follow your healthcare professional's instructions.

## 2020-01-09 NOTE — PROGRESS NOTES
Subjective     Ed Mckenzie is a 34 year old male who presents to clinic today for the following health issues:    HPI   Gout  Duration: ongoing since 09/2019  Description   Location: knee - left. Since last ov he states his left wrist and left thumb are much better   Joint Swelling: YES, but has reduced   Redness: YES  Pain intensity:  Moderate to severe  Accompanying signs and symptoms: None  History  Previous history of gout: YES   Trauma to the area: no   Precipitating factors:   Alcohol usage: NONE  Diuretic use: YES- allopurinol.   Recent illness: no   Therapies tried and outcome: ace bandage.   Patient states that he was put on indomethacin the last office visit. He completed his 5 day course of this, returned back to allopurinol. He had MRI completed at [facility] on 12/27/2019 that showed PCL deterioration, followed up on 12/30/2019 and prescribed a Medrol pack and recommended PT. He states that he did not take the allopurinol while taking the medrol pack because he was uncertain of any possible interactions.      Quit drinking since last visit.  Changed his diet and cutting back on sugar.    Feels good but reports left knee pain - probably unrelated to gout.  Seeing [facility].    Patient Active Problem List   Diagnosis     Benign essential hypertension     History of gout     Alcohol-induced acute pancreatitis     Alcohol abuse     Tobacco dependence syndrome     Gastroesophageal reflux disease with esophagitis     Acute pancreatitis without infection or necrosis     Pancreatitis     Past Surgical History:   Procedure Laterality Date     HERNIA REPAIR         Social History     Tobacco Use     Smoking status: Current Every Day Smoker     Packs/day: 0.50     Smokeless tobacco: Never Used   Substance Use Topics     Alcohol use: Yes     Comment: twice a week     Family History   Problem Relation Age of Onset     Breast Cancer Mother      Ovarian Cancer Mother 58         Current Outpatient Medications   Medication Sig  Dispense Refill     allopurinol (ZYLOPRIM) 100 MG tablet Take 1 tablet (100 mg) by mouth daily 30 tablet 1     Multiple Vitamin (ONE-A-DAY MENS PO) Take 2 chew tab by mouth daily       Omeprazole Magnesium (PRILOSEC OTC PO) Take 1 tablet by mouth daily        OVER-THE-COUNTER Uricel  - 2 dropper fulls daily mixed with water for gout       predniSONE (DELTASONE) 20 MG tablet Take 2 tablets (40 mg) by mouth daily for 4 days, THEN 1 tablet (20 mg) daily for 4 days, THEN 0.5 tablets (10 mg) daily for 3 days. 15 tablet 0     indomethacin (INDOCIN) 50 MG capsule Take 1 capsule (50 mg) by mouth 2 times daily (with meals) for 5 days 10 capsule 3     metoprolol succinate (TOPROL-XL) 50 MG 24 hr tablet Take 1 tablet (50 mg) by mouth daily (Patient not taking: Reported on 9/25/2019) 90 tablet 1     Allergies   Allergen Reactions     Penicillins Anaphylaxis     Recent Labs   Lab Test 09/16/19  2217 11/28/18  0631 11/27/18  1618 07/18/18  0940  07/10/18  1550   LDL  --   --   --   --   --  115*   HDL  --   --   --   --   --  63   TRIG  --   --   --   --   --  193*   * 32 56 68   < > 102*   CR 0.93 0.85 1.07 1.03   < > 1.09   GFRESTIMATED >90 >90 79 83   < > 77   GFRESTBLACK >90 >90 >90 >90   < > >90   POTASSIUM 3.7 3.9 3.6 4.0   < > 3.3*   TSH  --   --   --  3.10  --   --     < > = values in this interval not displayed.      BP Readings from Last 3 Encounters:   01/09/20 (!) 124/90   12/03/19 (!) 142/102   09/25/19 138/86    Wt Readings from Last 3 Encounters:   01/09/20 76.5 kg (168 lb 9.6 oz)   12/03/19 79.8 kg (176 lb)   09/25/19 79 kg (174 lb 3.2 oz)                    Reviewed and updated as needed this visit by Provider  Tobacco  Allergies  Meds  Problems  Med Hx  Surg Hx  Fam Hx         Review of Systems   ROS COMP: Constitutional, HEENT, cardiovascular, pulmonary, GI, , musculoskeletal, neuro, skin, endocrine and psych systems are negative, except as otherwise noted.      Objective    BP (!) 124/90 (BP  "Location: Left arm, Patient Position: Sitting, Cuff Size: Adult Regular)   Pulse 102   Temp 98.8  F (37.1  C) (Tympanic)   Resp 14   Ht 1.854 m (6' 1\")   Wt 76.5 kg (168 lb 9.6 oz)   SpO2 99%   BMI 22.24 kg/m    Body mass index is 22.24 kg/m .  Physical Exam   GENERAL: healthy, alert and no distress  RESP: lungs clear to auscultation - no rales, rhonchi or wheezes  CV: regular rate and rhythm, normal S1 S2, no S3 or S4, no murmur, click or rub, no peripheral edema and peripheral pulses strong  ABDOMEN: soft, nontender, no hepatosplenomegaly, no masses and bowel sounds normal  MS: no joint swelling noted.  Tenderness along lateral joint line and with collateral ligament intact but with pain with stress on lateral side.  FULL ROM but tenderness with full flexion.  Right knee with small raised soft mass.  Non tender.  SKIN: no suspicious lesions or rashes    Diagnostic Test Results:  Labs reviewed in Epic  MRI done at [facility] - will obtain and review        Assessment & Plan     1. Acute idiopathic gout of multiple sites   Improved since treatment - adding Allopurinol   Stopped alcohol and changed diet.    - RHEUMATOLOGY REFERRAL    2. Alcohol abuse   Discussed continue cessation.    - Hepatic panel  - Uric acid    3. Acute gouty arthropathy     - RHEUMATOLOGY REFERRAL    4. Acute pain of left knee  Advised continue ace wrap - added Prednisone.  Check CRP and follow up with [facility]   Can do PT prior to follow up   - predniSONE (DELTASONE) 20 MG tablet; Take 2 tablets (40 mg) by mouth daily for 4 days, THEN 1 tablet (20 mg) daily for 4 days, THEN 0.5 tablets (10 mg) daily for 3 days.  Dispense: 15 tablet; Refill: 0  - CRP inflammation     Total times spent with patient 30 minutes of which > 50% of the time was spent counseling and coordination of care discussion of above, referral, gout recommendations, medications and follow up     Patient Instructions     Patient Education     Knee Pain  Knee pain is very common. It s " especially common in active people who put a lot of pressure on their knees, like runners. It affects women more often than men.  Your kneecap (patella) is a thick, round bone. It covers and protects the front portion of your knee joint. It moves along a groove in your thighbone (femur) as part of the patellofemoral joint. A layer of cartilage surrounds the underside of your kneecap. This layer protects it from grinding against your femur.  When this cartilage softens and breaks down, it can cause knee pain. This is partly because of repetitive stress. The stress irritates the lining of the joint. This causes pain in the underlying bone.  What causes knee pain?  Many things can cause knee pain. You may have more than one cause. Some of these include:    Overuse of the knee joint    The kneecap doesn t line up with the tissue around it    Damage to small nerves in the area    Damage to the ligament-like structure that holds the kneecap in place (retinaculum)    Breakdown of the bone under the cartilage    Swelling in the soft tissues around the kneecap    Injury  You might be more likely to have knee pain if you:    Exercise a lot    Recently increased the intensity of your workouts    Have a body mass index (BMI) greater than 25    Have poor alignment of your kneecap    Walk with your feet turned overly outward or inward    Have weakness in surrounding muscle groups (inner quad or hip adductor muscles)    Have too much tightness in surrounding muscle groups (hamstrings or iliotibial band)    Have a recent history of injury to the area    Are female  Symptoms of knee pain  This type of knee pain is a dull, aching pain in the front of the knee in the area under and around the kneecap. This pain may start quickly or slowly. Your pain might be worse when you squat, run, or sit for a long time. Stairclimbing may be painful or difficult. You might also sometimes feel like your knee is giving out. You may have symptoms in  one or both of your knees.  Diagnosing knee pain  Your healthcare provider will ask about your medical history and your symptoms. Be sure to describe any activities that make your knee pain worse. He or she will look at your knee. This will include tests of your range of motion, strength, and areas of pain of your knee. Your knee alignment will be checked.  Your healthcare provider will need to rule out other causes of your knee pain, such as arthritis. You may need an imaging test, such as an X-ray or MRI.  Treatment for knee pain  Treatments that can help ease your symptoms may include:    Avoiding activities for a while that make your pain worse, returning to activity over time    Icing the outside of your knee when it causes you pain    Taking over-the-counter pain medicine    Wearing a knee brace or taping your knee to support it    Wearing special shoe inserts to help keep your feet in the proper alignment    Doing special exercises to stretch and strengthen the muscles around your hip and your knee  These steps help most people manage knee pain. But some cases of knee pain need to be treated with surgery. You rarely need surgery right away. You may need it later if other treatments don t work. Your healthcare provider may refer you to an orthopedic surgeon. He or she will talk with you about your choices.  Preventing knee pain  Losing weight and correcting excess muscle tightness or muscle weakness may help lower your risk.  In some cases, you can prevent knee pain. To help prevent a flare-up of knee pain, do these things:    Regularly do all the exercises your doctor or physical therapist advises    Support your knee as advised by your doctor or physical therapist    Increase training gradually, and ease up on training when needed    Have an expert check your gait for running or other sporting activities    Stretch properly before and after exercise    Replace your running shoes regularly    Lose excess  weight  When to call your healthcare provider  Call your healthcare provider right away if:    Your symptoms don t get better after a few weeks of treatment    You have any new symptoms  Date Last Reviewed: 4/1/2017 2000-2019 [employer]. [address] [place] [address], [place] [address]. All rights reserved. This information is not intended as a substitute for professional medical care. Always follow your healthcare professional's instructions.               No follow-ups on file.    Chelle Rashid NP  [facility]

## 2020-04-06 ENCOUNTER — TELEPHONE (OUTPATIENT)
Dept: NEUROLOGY | Facility: CLINIC | Age: 35
End: 2020-04-06

## 2020-04-06 NOTE — TELEPHONE ENCOUNTER
I called patient and left message for patient to call back and reschedule appt on 4/8/2020 to a Video appt..    Nithya Martinez LPN

## 2020-07-20 ENCOUNTER — TELEPHONE (OUTPATIENT)
Dept: RHEUMATOLOGY | Facility: CLINIC | Age: 35
End: 2020-07-20

## 2020-07-20 NOTE — TELEPHONE ENCOUNTER
Call placed to patient to reschedule 8/13/20 appointment due to provider vacation.  Patient states that he has been reschedule a couple times already (our records show one reschedule with Dr. Chen and one cancelled with Dr. Potts).  Patient states he refuses to pay a copay to have a video visit with a specialist that he has been referred to.  Advised patient that I would send a message to the clinic to have them review and contact him to advise if they are able to schedule an in person visit.  Patient states understanding.      Carol Westholter

## 2020-07-31 NOTE — TELEPHONE ENCOUNTER
Patient is scheduled for 09/16/20 at 3:30pm for In clinic visit with Dr. Chen. Jeannine Krishna CMA

## 2021-05-25 ENCOUNTER — RECORDS - HEALTHEAST (OUTPATIENT)
Dept: ADMINISTRATIVE | Facility: CLINIC | Age: 36
End: 2021-05-25

## 2021-06-10 NOTE — ED PROVIDER NOTES
History     Chief Complaint   Patient presents with     Abdominal Pain     HPI  Ed Mckenzie is a 33 year old male with a history of gout who presents emergency department complaining of epigastric abdominal pain radiating into her chest.  Patient states he has a history of reflux and gastritis and takes daily omeprazole.  States he ran out of it 2 days ago after eating had significant epigastric pain.  He also did not have any milk and did not sleep well 2 nights ago yesterday morning his wife got him some milk and he drank this was feeling fine and had a day off which was uneventful.  He went to work this afternoon and had a dull ache in his stomach and about 1:30 this afternoon had significant cramping pain in beak came diaphoretic.  The pain he describes as sharp at times but releasing.  Radiated into his back.  He became diaphoretic and tingling sweaty and therefore was driven home but during this episode the pain intensified and an ambulance was called.  Reports from the ambulance say he was hyperventilated.  He was given 100 mg of fentanyl and states he is feeling a bit better at this time.  He denies any recent illness.  He denies headache, visual changes, neck pain, chest pain, shortness of breath, back pain, focal numbness weakness any extremity.  He has not had any urinary symptoms.  He has had normal bowel movements.  Currently rates pain a 3 out of 10.  Patient has a history of smoking but denies hypertension, hypercholesterolemia diabetes or significant family history of coronary artery disease.    Problem List:    Patient Active Problem List    Diagnosis Date Noted     Acute idiopathic gout of multiple sites 10/16/2017     Priority: Medium        Past Medical History:    No past medical history on file.    Past Surgical History:    No past surgical history on file.    Family History:    Family History   Problem Relation Age of Onset     Breast Cancer Mother        Social History:  Marital Status:  " Single [1]  Social History   Substance Use Topics     Smoking status: Current Every Day Smoker     Packs/day: 0.50     Smokeless tobacco: Never Used     Alcohol use Yes        Medications:      allopurinol (ZYLOPRIM) 100 MG tablet   colchicine (COLCRYS) 0.6 MG tablet   COLCRYS 0.6 MG tablet   ibuprofen 200 MG capsule   indomethacin (INDOCIN) 50 MG capsule   Multiple Vitamins-Minerals (MULTIVITAMIN ADULT PO)   Omeprazole Magnesium (PRILOSEC OTC PO)         Review of Systems   Constitutional: Positive for activity change, appetite change and diaphoresis. Negative for chills and fever.   HENT: Negative for congestion, sore throat and trouble swallowing.    Eyes: Negative for visual disturbance.   Respiratory: Positive for chest tightness. Negative for cough, shortness of breath, wheezing and stridor.    Cardiovascular: Negative for chest pain, palpitations and leg swelling.   Gastrointestinal: Positive for abdominal pain and nausea. Negative for blood in stool, constipation, diarrhea and vomiting.   Genitourinary: Negative for decreased urine volume, dysuria and flank pain.   Musculoskeletal: Negative for back pain and neck pain.   Skin: Negative for rash.   Neurological: Positive for light-headedness and numbness. Negative for dizziness, tremors, speech difficulty, weakness and headaches.   Hematological: Does not bruise/bleed easily.   Psychiatric/Behavioral: Negative for confusion.       Physical Exam   BP: (!) 173/132  Heart Rate: 94  Height: 185.4 cm (6' 1\")  SpO2: 99 %      Physical Exam   Constitutional: He appears well-developed and well-nourished. No distress.   HENT:   Head: Normocephalic.   Nose: Nose normal.   Mouth/Throat: Oropharynx is clear and moist.   Posterior pharynx no erythema edema.   Eyes: Conjunctivae are normal.   Neck: Normal range of motion. Neck supple. No JVD present.   Cardiovascular: Normal rate, regular rhythm, normal heart sounds and intact distal pulses.    No murmur " heard.  Pulmonary/Chest: Effort normal and breath sounds normal. He has no wheezes. He has no rales.   Abdominal: Soft.   Mild tenderness palpation epigastric region.  No guarding or rebound bowel sounds slightly hyperactive.   Musculoskeletal: Normal range of motion. He exhibits no edema or tenderness.   Neurological: He is alert. He exhibits normal muscle tone. Coordination normal.   Skin: Skin is warm and dry. No rash noted.   Psychiatric: He has a normal mood and affect.   Nursing note and vitals reviewed.      ED Course     ED Course     Procedures               EKG Interpretation:      Interpreted by Juan C Cantu  Rhythm: normal sinus with rate variation  Rate: normal  Axis: normal  Ectopy: none  Conduction: normal  ST Segments/ T Waves: No ST-T wave changes  Q Waves: none  Comparison to prior: No old EKG available    Clinical Impression: normal EKG          Critical Care time:  none               No results found for this or any previous visit (from the past 24 hour(s)).    Medications - No data to display    Assessments & Plan (with Medical Decision Making) records were reviewed.  Labs were obtained.  EKG was obtained.  Patient was given IV fluids aspirin and a GI cocktail.  EKG revealed no acute abnormality.  Continue to have pain and was given Dilaudid for pain.  Patient's white count was 13.0 hemoglobin 16.5 platelet count 251 there is mild left shift.  Comprehensive metabolic panel significant for potassium slightly decreased at 3.3 alk phos 200 AST 92 .  Patient's lipase elevated at 2358. Patient states he has moderate alcohol use.   Troponin normal.  Patient blood pressure remained elevated and was given clonidine.  Patient had a right upper quadrant ultrasound obtained which revealed no obvious acute abnormality other than a fatty liver.  Pancreas was not completely visualized and therefore a CT scan of the abdomen and pelvis was done.  This revealed acute pancreatitis with mild to  moderate amount of edema about the pancreatic head and neck.  No evidence of pancreatic necrosis or pseudocyst.Patient received further pain medication. His blood pressure continued to be elevated and he was given a dose of labetalol. Due to need for continued pain control it was decided to admit the patient. He and his wife were informed of findings and plan throughout his stay and they are in agreement with this plan. I discussed the case with Caridad HAYS with hospitalist service. And she is in agreement with the admission.     I have reviewed the nursing notes.    I have reviewed the findings, diagnosis, plan and need for follow up with the patient.       New Prescriptions    No medications on file       Final diagnoses:   Abdominal pain, epigastric   Acute pancreatitis without infection or necrosis, unspecified pancreatitis type   Essential hypertension       7/10/2018   [facility] EMERGENCY DEPARTMENT     Juan C Cantu MD  07/11/18 1222       Juan C Cantu MD  07/11/18 1156     Methotrexate Counseling:  Patient counseled regarding adverse effects of methotrexate including but not limited to nausea, vomiting, abnormalities in liver function tests. Patients may develop mouth sores, rash, diarrhea, and abnormalities in blood counts. The patient understands that monitoring is required including LFT's and blood counts.  There is a rare possibility of scarring of the liver and lung problems that can occur when taking methotrexate. Persistent nausea, loss of appetite, pale stools, dark urine, cough, and shortness of breath should be reported immediately. Patient advised to discontinue methotrexate treatment at least three months before attempting to become pregnant.  I discussed the need for folate supplements while taking methotrexate.  These supplements can decrease side effects during methotrexate treatment. The patient verbalized understanding of the proper use and possible adverse effects of methotrexate.  All of the patient's questions and concerns were addressed.

## 2022-01-16 ENCOUNTER — HEALTH MAINTENANCE LETTER (OUTPATIENT)
Age: 37
End: 2022-01-16

## 2022-06-20 ENCOUNTER — TELEPHONE (OUTPATIENT)
Dept: FAMILY MEDICINE | Facility: CLINIC | Age: 37
End: 2022-06-20
Payer: COMMERCIAL

## 2022-06-20 DIAGNOSIS — F10.10 ALCOHOL ABUSE: ICD-10-CM

## 2022-06-20 DIAGNOSIS — M10.9 ACUTE GOUTY ARTHRITIS: ICD-10-CM

## 2022-06-20 RX ORDER — INDOMETHACIN 50 MG/1
50 CAPSULE ORAL 2 TIMES DAILY WITH MEALS
Qty: 10 CAPSULE | Refills: 3 | Status: CANCELLED | OUTPATIENT
Start: 2022-06-20

## 2022-06-20 NOTE — TELEPHONE ENCOUNTER
RN reached out to patient for more information re: gout attack before forwarding to provider.  Patient states his gout attacks have been fewer and further between, in the past had been in ankles and toes, but this time in R wrist-swelling, warm to touch and very similar pain to previous gout attacks. He denies any recent injuries, etc., but did move about a week ago and was lifting a lot of boxes, etc.    RN notes patient has allopurinol prescribed, but was last in 2020, so he hasn't taken it for several months. He also hasn't been seen since 1/2020. RN advised a visit for evaluation, diagnosis and treatment of his wrist concern.  He is open to this.  Scheduled for same-day tomorrow with PCP given warm to touch, etc. to r/o other cause like cellulitis as well.     Rani Colon RN  [facility]

## 2022-06-20 NOTE — TELEPHONE ENCOUNTER
Reason for Call:  Medication or medication refill:    Do you use a [facility]?  Name of the pharmacy and phone number for the current request:  [address]    Name of the medication requested: Indomethicin    Other request: Patient having a gout attack    Can we leave a detailed message on this number? YES    Phone number patient can be reached at: Home number on file [phone] (home)    Best Time: Any    Call taken on 6/20/2022 at 9:37 AM by Berkley Anguiano

## 2022-06-21 ENCOUNTER — OFFICE VISIT (OUTPATIENT)
Dept: FAMILY MEDICINE | Facility: CLINIC | Age: 37
End: 2022-06-21
Payer: COMMERCIAL

## 2022-06-21 VITALS
OXYGEN SATURATION: 97 % | TEMPERATURE: 98.1 F | BODY MASS INDEX: 21.25 KG/M2 | WEIGHT: 156.9 LBS | RESPIRATION RATE: 18 BRPM | HEART RATE: 70 BPM | DIASTOLIC BLOOD PRESSURE: 78 MMHG | SYSTOLIC BLOOD PRESSURE: 132 MMHG | HEIGHT: 72 IN

## 2022-06-21 DIAGNOSIS — F10.21 HISTORY OF ALCOHOL DEPENDENCE (H): ICD-10-CM

## 2022-06-21 DIAGNOSIS — M10.09 ACUTE IDIOPATHIC GOUT OF MULTIPLE SITES: ICD-10-CM

## 2022-06-21 DIAGNOSIS — I10 BENIGN ESSENTIAL HYPERTENSION: Primary | ICD-10-CM

## 2022-06-21 DIAGNOSIS — R45.4 IRRITABLE BEHAVIOR: ICD-10-CM

## 2022-06-21 DIAGNOSIS — Z13.6 CARDIOVASCULAR SCREENING; LDL GOAL LESS THAN 130: ICD-10-CM

## 2022-06-21 LAB
ALBUMIN SERPL-MCNC: 3.7 G/DL (ref 3.4–5)
ALP SERPL-CCNC: 104 U/L (ref 40–150)
ALT SERPL W P-5'-P-CCNC: 21 U/L (ref 0–70)
ANION GAP SERPL CALCULATED.3IONS-SCNC: 3 MMOL/L (ref 3–14)
AST SERPL W P-5'-P-CCNC: 15 U/L (ref 0–45)
BILIRUB SERPL-MCNC: 0.4 MG/DL (ref 0.2–1.3)
BUN SERPL-MCNC: 12 MG/DL (ref 7–30)
CALCIUM SERPL-MCNC: 9.5 MG/DL (ref 8.5–10.1)
CHLORIDE BLD-SCNC: 108 MMOL/L (ref 94–109)
CHOLEST SERPL-MCNC: 117 MG/DL
CO2 SERPL-SCNC: 29 MMOL/L (ref 20–32)
CREAT SERPL-MCNC: 0.87 MG/DL (ref 0.66–1.25)
CRP SERPL-MCNC: 20.6 MG/L (ref 0–8)
ERYTHROCYTE [DISTWIDTH] IN BLOOD BY AUTOMATED COUNT: 12.5 % (ref 10–15)
FASTING STATUS PATIENT QL REPORTED: NO
GFR SERPL CREATININE-BSD FRML MDRD: >90 ML/MIN/1.73M2
GLUCOSE BLD-MCNC: 114 MG/DL (ref 70–99)
HCT VFR BLD AUTO: 44.8 % (ref 40–53)
HDLC SERPL-MCNC: 36 MG/DL
HGB BLD-MCNC: 15.2 G/DL (ref 13.3–17.7)
LDLC SERPL CALC-MCNC: 60 MG/DL
MCH RBC QN AUTO: 29.4 PG (ref 26.5–33)
MCHC RBC AUTO-ENTMCNC: 33.9 G/DL (ref 31.5–36.5)
MCV RBC AUTO: 87 FL (ref 78–100)
NONHDLC SERPL-MCNC: 81 MG/DL
PLATELET # BLD AUTO: 278 10E3/UL (ref 150–450)
POTASSIUM BLD-SCNC: 4.1 MMOL/L (ref 3.4–5.3)
PROT SERPL-MCNC: 7.7 G/DL (ref 6.8–8.8)
RBC # BLD AUTO: 5.17 10E6/UL (ref 4.4–5.9)
SODIUM SERPL-SCNC: 140 MMOL/L (ref 133–144)
TRIGL SERPL-MCNC: 103 MG/DL
URATE SERPL-MCNC: 6.8 MG/DL (ref 3.5–7.2)
WBC # BLD AUTO: 11 10E3/UL (ref 4–11)

## 2022-06-21 PROCEDURE — 86140 C-REACTIVE PROTEIN: CPT | Performed by: NURSE PRACTITIONER

## 2022-06-21 PROCEDURE — 36415 COLL VENOUS BLD VENIPUNCTURE: CPT | Performed by: NURSE PRACTITIONER

## 2022-06-21 PROCEDURE — 99214 OFFICE O/P EST MOD 30 MIN: CPT | Performed by: NURSE PRACTITIONER

## 2022-06-21 PROCEDURE — 80053 COMPREHEN METABOLIC PANEL: CPT | Performed by: NURSE PRACTITIONER

## 2022-06-21 PROCEDURE — 80061 LIPID PANEL: CPT | Performed by: NURSE PRACTITIONER

## 2022-06-21 PROCEDURE — 85027 COMPLETE CBC AUTOMATED: CPT | Performed by: NURSE PRACTITIONER

## 2022-06-21 PROCEDURE — 84550 ASSAY OF BLOOD/URIC ACID: CPT | Performed by: NURSE PRACTITIONER

## 2022-06-21 RX ORDER — INDOMETHACIN 50 MG/1
50 CAPSULE ORAL 2 TIMES DAILY WITH MEALS
Qty: 10 CAPSULE | Refills: 3 | Status: SHIPPED | OUTPATIENT
Start: 2022-06-21 | End: 2023-03-09

## 2022-06-21 ASSESSMENT — PAIN SCALES - GENERAL: PAINLEVEL: SEVERE PAIN (7)

## 2022-06-21 NOTE — PROGRESS NOTES
Assessment & Plan     Benign essential hypertension  Controlled - not on medication.  Most likely due to lifestyle - quit alcohol 1 year prior.    Acute idiopathic gout of multiple sites  Discussed diet/lifestyle changes.  Quit energy drinks.  Labs today.  - Comprehensive metabolic panel (BMP + Alb, Alk Phos, ALT, AST, Total. Bili, TP)  - Uric acid  - CRP, inflammation  - CBC with platelets    Irritable behavior  Discussed situational concerns, stress reduction.  Follow-up if not improving.    History of alcohol dependence (H)  Quit alcohol 1 year prior.    - indomethacin (INDOCIN) 50 MG capsule  Dispense: 10 capsule; Refill: 3    CARDIOVASCULAR SCREENING; LDL GOAL LESS THAN 130     - Lipid panel reflex to direct LDL Non-fasting  - Lipid panel reflex to direct LDL Non-fasting          Tobacco Cessation:   reports that he has been smoking. He has a 20.00 pack-year smoking history. He has never used smokeless tobacco.  Tobacco Cessation Action Plan: Information offered: Patient not interested at this time    See Patient Instructions    No follow-ups on file.    Chelle Rashid NP  [facility]    Castillo Ward is a 37 year old, presenting for the following health issues:  Musculoskeletal Problem (Wrist pain)      History of Present Illness       Reason for visit:  Wrist Pain    He eats 0-1 servings of fruits and vegetables daily.He consumes 3 sweetened beverage(s) daily.He exercises with enough effort to increase his heart rate 30 to 60 minutes per day.  He exercises with enough effort to increase his heart rate 4 days per week. He is missing 1 dose(s) of medications per week.     Gout/ Single Inflamed Joint  Onset/Duration: wrist  Description:   Location: wrist - right  Joint Swelling: YES  Redness: YES  Pain: YES  Intensity: mild  Progression of Symptoms: same  Accompanying Signs & Symptoms:  Fevers: no  History:   Trauma to the area: no  Previous history of gout: YES  Recent  "illness: no  Alcohol use: no  Diuretic use: no  Precipitating or alleviating factors: None  Therapies tried and outcome: none    Drinks Monster energy drinks 3-4 per day.  Eating less - diet fair.  Drinking green tea.    Cut out alcohol - and gout flares improved.  No recent red meat or seafood.  Eat ice cream daily.     Reports 20# weight loss.  No FH lung, or colon cancer.      Review of Systems   Constitutional, HEENT, cardiovascular, pulmonary, GI, , musculoskeletal, neuro, skin, endocrine and psych systems are negative, except as otherwise noted.      Objective    /78 (BP Location: Left arm, Patient Position: Sitting, Cuff Size: Adult Regular)   Pulse 70   Temp 98.1  F (36.7  C) (Tympanic)   Resp 18   Ht 1.832 m (6' 0.13\")   Wt 71.2 kg (156 lb 14.4 oz)   SpO2 97%   BMI 21.21 kg/m    Body mass index is 21.21 kg/m .   Wt Readings from Last 4 Encounters:   06/21/22 71.2 kg (156 lb 14.4 oz)   01/09/20 76.5 kg (168 lb 9.6 oz)   12/03/19 79.8 kg (176 lb)   09/25/19 79 kg (174 lb 3.2 oz)       Physical Exam   GENERAL: healthy, alert and no distress  NECK: no adenopathy, no asymmetry, masses, or scars and thyroid normal to palpation  RESP: lungs clear to auscultation - no rales, rhonchi or wheezes  CV: regular rate and rhythm, normal S1 S2, no S3 or S4, no murmur, click or rub, no peripheral edema and peripheral pulses strong  ABDOMEN: soft, nontender, no hepatosplenomegaly, no masses and bowel sounds normal  MS: Right wrist joint with mild erythema and mild tenderness anterior bilateral. Full ROM.                   .  ..  "

## 2022-07-13 ENCOUNTER — HOSPITAL ENCOUNTER (EMERGENCY)
Facility: CLINIC | Age: 37
Discharge: HOME | End: 2022-07-13
Attending: FAMILY MEDICINE | Admitting: FAMILY MEDICINE
Payer: COMMERCIAL

## 2022-07-13 VITALS
HEIGHT: 73 IN | RESPIRATION RATE: 18 BRPM | SYSTOLIC BLOOD PRESSURE: 136 MMHG | DIASTOLIC BLOOD PRESSURE: 98 MMHG | TEMPERATURE: 98.2 F | WEIGHT: 160 LBS | OXYGEN SATURATION: 100 % | BODY MASS INDEX: 21.2 KG/M2 | HEART RATE: 64 BPM

## 2022-07-13 DIAGNOSIS — L25.5 PLANT DERMATITIS: ICD-10-CM

## 2022-07-13 PROCEDURE — 99284 EMERGENCY DEPT VISIT MOD MDM: CPT | Performed by: FAMILY MEDICINE

## 2022-07-13 PROCEDURE — 99283 EMERGENCY DEPT VISIT LOW MDM: CPT

## 2022-07-13 RX ORDER — PREDNISONE 20 MG/1
40 TABLET ORAL DAILY
Qty: 20 TABLET | Refills: 0 | Status: SHIPPED | OUTPATIENT
Start: 2022-07-13 | End: 2022-07-23

## 2022-07-13 RX ORDER — TRIAMCINOLONE ACETONIDE 1 MG/G
CREAM TOPICAL 2 TIMES DAILY
Qty: 80 G | Refills: 0 | Status: SHIPPED | OUTPATIENT
Start: 2022-07-13 | End: 2023-03-09

## 2022-07-13 NOTE — ED PROVIDER NOTES
History     Chief Complaint   Patient presents with     Rash     HPI  Ed Mckenzie is a 37 year old male, past medical history is significant for gout, alcohol abuse, alcohol induced pancreatitis, tobacco dependence, GERD, hypertension, presents to the emergency department with concerns of 2 to 3 weeks of rash.  History is obtained from the patient who identifies poison ivy exposure to his lower extremities while clearing some brush about 2 or 3 weeks ago.  Since that time he is developed raised red vesicular appearing rash primarily the left lower extremity but also the right.  He treated this initially with topicals with some success but unfortunately things have progressed and he is also noted similar appearance of rash and intense pruritus involving his arms as well as the legs.  He is also tried oral Benadryl and additional topical products like calamine and over-the-counter potency corticosteroid.    Allergies:  Allergies   Allergen Reactions     Pcn [Penicillins] Anaphylaxis       Problem List:    Patient Active Problem List    Diagnosis Date Noted     Irritable behavior 06/21/2022     Priority: Medium     History of gout 11/27/2018     Priority: Medium     Alcohol-induced acute pancreatitis 11/27/2018     Priority: Medium     Alcohol abuse 11/27/2018     Priority: Medium     Tobacco dependence syndrome 11/27/2018     Priority: Medium     Gastroesophageal reflux disease with esophagitis 11/27/2018     Priority: Medium     Acute pancreatitis without infection or necrosis 11/27/2018     Priority: Medium     Pancreatitis 11/27/2018     Priority: Medium     Benign essential hypertension 07/18/2018     Priority: Medium        Past Medical History:    Past Medical History:   Diagnosis Date     Acute idiopathic gout of multiple sites 10/16/2017     Acute pancreatitis without infection or necrosis 7/10/2018       Past Surgical History:    Past Surgical History:   Procedure Laterality Date     HERNIA REPAIR    "      Family History:    Family History   Problem Relation Age of Onset     Breast Cancer Mother      Ovarian Cancer Mother 58       Social History:  Marital Status:  Single [1]  Social History     Tobacco Use     Smoking status: Current Every Day Smoker     Packs/day: 1.00     Years: 20.00     Pack years: 20.00     Smokeless tobacco: Never Used   Vaping Use     Vaping Use: Some days     Substances: THC, Flavoring     Devices: Disposable   Substance Use Topics     Alcohol use: Not Currently     Comment: twice a week     Drug use: Yes     Types: Marijuana     Comment: 3 times per week        Medications:    predniSONE (DELTASONE) 20 MG tablet  triamcinolone (KENALOG) 0.1 % external cream  allopurinol (ZYLOPRIM) 100 MG tablet  indomethacin (INDOCIN) 50 MG capsule  metoprolol succinate (TOPROL-XL) 50 MG 24 hr tablet  Multiple Vitamin (ONE-A-DAY MENS PO)  Omeprazole Magnesium (PRILOSEC OTC PO)  OVER-THE-COUNTER          Review of Systems   All other systems reviewed and are negative.      Physical Exam   BP: (!) 136/98  Pulse: 64  Temp: 98.2  F (36.8  C)  Resp: 18  Height: 185.4 cm (6' 1\")  Weight: 72.6 kg (160 lb)  SpO2: 100 %      Physical Exam  Vitals and nursing note reviewed.   Constitutional:       General: He is not in acute distress.     Appearance: Normal appearance. He is normal weight. He is not ill-appearing.   Skin:     Findings: Rash present.      Comments: Primarily left shin area displays a secondarily excoriated rash with difficult to identify primary features.  The right lower and bilateral upper extremities do have a more classic erythematous Aristeo based vesicular eruption consistent with plant dermatitis.   Neurological:      Mental Status: He is alert.         ED Course                 Procedures              Critical Care time:  none               No results found for this or any previous visit (from the past 24 hour(s)).    Medications - No data to display    Assessments & Plan (with Medical " Decision Making)   37-year-old male past medical history reviewed as above who presents with concerns of pruritic rash as described in HPI documented on exam.  Exam is consistent with secondarily excoriated plant dermatitis suspected.  I have placed him on both Kenalog cream for the left lower extremity which is most intensely affected and oral prednisone for the next 10 days.  If this not improving or worse he will return to the emergency department/urgent care otherwise to follow-up with primary care provider in clinic.        Disclaimer: This note consists of symbols derived from keyboarding, dictation and/or voice recognition software. As a result, there may be errors in the script that have gone undetected. Please consider this when interpreting information found in this chart.      I have reviewed the nursing notes.    I have reviewed the findings, diagnosis, plan and need for follow up with the patient.          New Prescriptions    PREDNISONE (DELTASONE) 20 MG TABLET    Take 2 tablets (40 mg) by mouth daily for 10 days    TRIAMCINOLONE (KENALOG) 0.1 % EXTERNAL CREAM    Apply topically 2 times daily 80 grams       Final diagnoses:   Plant dermatitis       7/13/2022   [facility] EMERGENCY DEPT     Solitario Tineo MD  07/13/22 1012

## 2022-07-13 NOTE — ED TRIAGE NOTES
Pt here with a rash the started about 2 weeks ago on BLE and has now spread to BUE and trunk and getting worse. Was clearing some brush on his property. Has tried calamine lotion.      Triage Assessment     Row Name 07/13/22 0864       Triage Assessment (Adult)    Airway WDL WDL       Respiratory WDL    Respiratory WDL WDL       Skin Circulation/Temperature WDL    Skin Circulation/Temperature WDL X       Cardiac WDL    Cardiac WDL WDL       Peripheral/Neurovascular WDL    Peripheral Neurovascular WDL WDL       Cognitive/Neuro/Behavioral WDL    Cognitive/Neuro/Behavioral WDL WDL

## 2022-07-13 NOTE — DISCHARGE INSTRUCTIONS
Topical corticosteroid for the left leg as we discussed.  Prednisone 40 mg p.o. daily x10 days.  Avoid repeat exposure, if you do get it on any body parts make sure you shower within 6-8 hours that usually effective in preventing any outbreaks.  Consider preventative topical product such as Ivy block.  Return to the emergency department as needed.

## 2023-03-08 NOTE — PATIENT INSTRUCTIONS
1. Do not take aspirin.  2. Avoid alcohol - especially beer and hard alcohol.  3. Obesity is a risk factor for gout. If you are overweight, you should participate in a weight loss plan.  4. To prevent further episodes of gout you should make changes to your diet.    - Eat less red meat, seafood, and foods/drinks with high-fructose corn syrup.   - Eat more low-fat dairy products, and foods with complex carbohydrates (whole     grains, brown rice, oats, beans)  5. Vitamin C 500 mg per day is recommended as it has a mild uric acid lowering effect.  6. If you have recurrent episodes of gout, you may need to be on a medication to lower uric acid levels.    Explained use and side effects of Zyban, such as insomnia, dizziness and dry mouth. Cost may not be covered by insurance, but in the long term, the cost is negligible compared to continuing to smoke cigarettes. Take once daily x 3 days, then BID for 8-12 weeks. Try taking the first pill very early in the morning, and the second pill in early afternoon to avoid insomnia. Set a quit date for 1-2 weeks after starting. Will ask pharmacy to supply the information kit with the drug. Follow up with me to check on progress at quitting smoking.      Preventive Health Recommendations  Male Ages 26 - 39    Yearly exam:             See your health care provider every year in order to  o   Review health changes.   o   Discuss preventive care.    o   Review your medicines if your doctor has prescribed any.  You should be tested each year for STDs (sexually transmitted diseases), if you re at risk.   After age 35, talk to your provider about cholesterol testing. If you are at risk for heart disease, have your cholesterol tested at least every 5 years.   If you are at risk for diabetes, you should have a diabetes test (fasting glucose).  Shots: Get a flu shot each year. Get a tetanus shot every 10 years.     Nutrition:  Eat at least 5 servings of fruits and vegetables daily.   Eat  whole-grain bread, whole-wheat pasta and brown rice instead of white grains and rice.   Get adequate Calcium and Vitamin D.     Lifestyle  Exercise for at least 150 minutes a week (30 minutes a day, 5 days a week). This will help you control your weight and prevent disease.   Limit alcohol to one drink per day.   No smoking.   Wear sunscreen to prevent skin cancer.   See your dentist every six months for an exam and cleaning.      [General Appearance - Well Developed] : well developed [Normal Appearance] : normal appearance [Well Groomed] : well groomed [General Appearance - Well Nourished] : well nourished [No Deformities] : no deformities [General Appearance - In No Acute Distress] : no acute distress [Normal Conjunctiva] : the conjunctiva exhibited no abnormalities [Eyelids - No Xanthelasma] : the eyelids demonstrated no xanthelasmas [Normal Oral Mucosa] : normal oral mucosa [No Oral Pallor] : no oral pallor [No Oral Cyanosis] : no oral cyanosis [Normal Jugular Venous A Waves Present] : normal jugular venous A waves present [Normal Jugular Venous V Waves Present] : normal jugular venous V waves present [No Jugular Venous Msith A Waves] : no jugular venous smith A waves [Heart Rate And Rhythm] : heart rate and rhythm were normal [Heart Sounds] : normal S1 and S2 [Murmurs] : no murmurs present [Respiration, Rhythm And Depth] : normal respiratory rhythm and effort [Exaggerated Use Of Accessory Muscles For Inspiration] : no accessory muscle use [Auscultation Breath Sounds / Voice Sounds] : lungs were clear to auscultation bilaterally [Abdomen Soft] : soft [Abdomen Tenderness] : non-tender [Abdomen Mass (___ Cm)] : no abdominal mass palpated [Nail Clubbing] : no clubbing of the fingernails [Cyanosis, Localized] : no localized cyanosis [Petechial Hemorrhages (___cm)] : no petechial hemorrhages [Skin Color & Pigmentation] : normal skin color and pigmentation [] : no rash [No Venous Stasis] : no venous stasis [Skin Lesions] : no skin lesions [No Skin Ulcers] : no skin ulcer [No Xanthoma] : no  xanthoma was observed [Oriented To Time, Place, And Person] : oriented to person, place, and time [Affect] : the affect was normal [Mood] : the mood was normal [No Anxiety] : not feeling anxious

## 2023-03-08 NOTE — PROGRESS NOTES
SUBJECTIVE:   CC: Ed is an 37 year old who presents for preventative health visit.      Patient has been advised of split billing requirements and indicates understanding: Yes  Healthy Habits:     Getting at least 3 servings of Calcium per day:  Yes    Bi-annual eye exam:  Yes    Dental care twice a year:  NO    Sleep apnea or symptoms of sleep apnea:  None    Diet:  Regular (no restrictions)    Frequency of exercise:  2-3 days/week    Duration of exercise:  30-45 minutes    Taking medications regularly:  Not Applicable    Medication side effects:  Not applicable    PHQ-2 Total Score: 0    Additional concerns today:  No     history of idiopathic gout - has episodes 1-2 x per year in ankles.   Episodes has improved since quitting drinking 2020.    History of alcohol overuse/abuse.    History of pancreatitis - no recurrent episodes since quitting drinking.  Current tobacco user - 1 pack per day for 15 years.  Looking at quitting smoking - has tried in the past unsuccessful.    Hypertension Follow-up      Do you check your blood pressure regularly outside of the clinic? No     Are you following a low salt diet? No    Are your blood pressures ever more than 140 on the top number (systolic) OR more   than 90 on the bottom number (diastolic), for example 140/90? No      How many servings of fruits and vegetables do you eat daily?  2-3    On average, how many sweetened beverages do you drink each day (Examples: soda, juice, sweet tea, etc.  Do NOT count diet or artificially sweetened beverages)?   3    How many days per week do you exercise enough to make your heart beat faster? 3 or less    How many minutes a day do you exercise enough to make your heart beat faster? 10 - 19    How many days per week do you miss taking your medication? N/a not on medication.       Today's PHQ-2 Score:   PHQ-2 ( 1999 [employer]) 3/9/2023   Q1: Little interest or pleasure in doing things 0   Q2: Feeling down, depressed or hopeless 0    PHQ-2 Score 0   PHQ-2 Total Score (12-17 Years)- Positive if 3 or more points; Administer PHQ-A if positive -   Q1: Little interest or pleasure in doing things Not at all   Q2: Feeling down, depressed or hopeless Not at all   PHQ-2 Score 0       Have you ever done Advance Care Planning? (For example, a Health Directive, POLST, or a discussion with a medical provider or your loved ones about your wishes): Yes, advance care planning is on file.    Social History     Tobacco Use     Smoking status: Every Day     Packs/day: 1.00     Years: 20.00     Pack years: 20.00     Types: Cigarettes     Smokeless tobacco: Never   Substance Use Topics     Alcohol use: Not Currently     Comment: twice a week          Alcohol Use 3/9/2023   Prescreen: >3 drinks/day or >7 drinks/week? Not Applicable   No flowsheet data found.    Last PSA: No results found for: PSA    Reviewed orders with patient. Reviewed health maintenance and updated orders accordingly - Yes  Lab work is in process  Labs reviewed in EPIC  BP Readings from Last 3 Encounters:   03/09/23 120/80   07/13/22 (!) 136/98   06/21/22 132/78    Wt Readings from Last 3 Encounters:   03/09/23 77.7 kg (171 lb 6.4 oz)   07/13/22 72.6 kg (160 lb)   06/21/22 71.2 kg (156 lb 14.4 oz)                  Patient Active Problem List   Diagnosis     Benign essential hypertension     History of gout     Tobacco dependence syndrome     Gastroesophageal reflux disease with esophagitis     Irritable behavior     History of alcohol dependence (H)     Idiopathic gout of ankle, unspecified chronicity, unspecified laterality     Past Surgical History:   Procedure Laterality Date     HERNIA REPAIR         Social History     Tobacco Use     Smoking status: Every Day     Packs/day: 1.00     Years: 20.00     Pack years: 20.00     Types: Cigarettes     Smokeless tobacco: Never   Substance Use Topics     Alcohol use: Not Currently     Comment: quit March 20, 2020     Family History   Problem Relation  Age of Onset     Breast Cancer Mother      Ovarian Cancer Mother 58     Colon Cancer No family hx of      Prostate Cancer No family hx of          Current Outpatient Medications   Medication Sig Dispense Refill     buPROPion (ZYBAN) 150 MG 12 hr tablet Take 1 tablet (150 mg) by mouth 2 times daily for 3 days, THEN 1 tablet (150 mg) 2 times daily for 30 days. 63 tablet 4     indomethacin (INDOCIN) 50 MG capsule Take 1 capsule (50 mg) by mouth 2 times daily (with meals) 10 capsule 3     Multiple Vitamin (ONE-A-DAY MENS PO) Take 2 chew tab by mouth every other day       Omeprazole Magnesium (PRILOSEC OTC PO) Take 1 tablet by mouth daily        OVER-THE-COUNTER Uricel  - 2 dropper fulls daily mixed with water for gout       triamcinolone (KENALOG) 0.1 % external cream Apply topically 2 times daily 80 grams 80 g 0     Allergies   Allergen Reactions     Pcn [Penicillins] Anaphylaxis     Recent Labs   Lab Test 06/21/22  0942 01/09/20  1132 09/16/19  2217 11/28/18  0631 11/27/18  1618 07/18/18  0940 07/11/18  0547 07/10/18  1550   LDL 60  --   --   --   --   --   --  115*   HDL 36*  --   --   --   --   --   --  63   TRIG 103  --   --   --   --   --   --  193*   ALT 21 22 110* 32   < > 68   < > 102*   CR 0.87  --  0.93 0.85   < > 1.03   < > 1.09   GFRESTIMATED >90  --  >90 >90   < > 83   < > 77   GFRESTBLACK  --   --  >90 >90   < > >90   < > >90   POTASSIUM 4.1  --  3.7 3.9   < > 4.0   < > 3.3*   TSH  --   --   --   --   --  3.10  --   --     < > = values in this interval not displayed.        Reviewed and updated as needed this visit by clinical staff   Tobacco  Allergies  Meds              Reviewed and updated as needed this visit by Provider     Meds               Past Medical History:   Diagnosis Date     Acute idiopathic gout of multiple sites 10/16/2017     Acute pancreatitis without infection or necrosis 7/10/2018     Acute pancreatitis without infection or necrosis 11/27/2018     Alcohol abuse 11/27/2018      "Alcohol-induced acute pancreatitis 11/27/2018     Pancreatitis 11/27/2018      Past Surgical History:   Procedure Laterality Date     HERNIA REPAIR       OB History   No obstetric history on file.       Review of Systems   Constitutional: Negative for chills and fever.   HENT: Negative for congestion, ear pain, hearing loss and sore throat.    Eyes: Negative for pain and visual disturbance.   Respiratory: Negative for cough and shortness of breath.    Cardiovascular: Negative for chest pain, palpitations and peripheral edema.   Gastrointestinal: Negative for abdominal pain, constipation, diarrhea, heartburn, hematochezia and nausea.   Genitourinary: Negative for dysuria, frequency, genital sores, hematuria, impotence, penile discharge and urgency.   Musculoskeletal: Negative for arthralgias, joint swelling and myalgias.   Skin: Negative for rash.   Neurological: Negative for dizziness, weakness, headaches and paresthesias.   Psychiatric/Behavioral: Negative for mood changes. The patient is not nervous/anxious.         OBJECTIVE:   /70 (BP Location: Right arm, Patient Position: Sitting, Cuff Size: Adult Large)   Pulse 60   Temp 97  F (36.1  C) (Tympanic)   Resp 20   Ht 1.815 m (5' 11.46\")   Wt 77.7 kg (171 lb 6.4 oz)   SpO2 97%   BMI 23.60 kg/m     BP Readings from Last 6 Encounters:   03/09/23 104/70   07/13/22 (!) 136/98   06/21/22 132/78   01/09/20 124/88   12/03/19 (!) 142/102   09/25/19 138/86         Physical Exam  GENERAL: healthy, alert and no distress  EYES: Eyes grossly normal to inspection, PERRL and conjunctivae and sclerae normal  HENT: ear canals and TM's normal, nose and mouth without ulcers or lesions  NECK: no adenopathy, no asymmetry, masses, or scars and thyroid normal to palpation  RESP: lungs clear to auscultation - no rales, rhonchi, expiratory wheezes throughout posterior bilateral.  RR 18.  CV: regular rate and rhythm, normal S1 S2, no S3 or S4, no murmur, click or rub, no " peripheral edema and peripheral pulses strong  ABDOMEN: soft, nontender, no hepatosplenomegaly, no masses and bowel sounds normal  MS: no gross musculoskeletal defects noted, no edema  SKIN: no suspicious lesions or rashes  NEURO: Normal strength and tone, mentation intact and speech normal  PSYCH: mentation appears normal, affect normal/bright    Diagnostic Test Results:  Labs reviewed in Epic    ASSESSMENT/PLAN:   (Z00.00) Routine general medical examination at a health care facility  (primary encounter diagnosis)  Comment:    Plan:      (F10.21) History of alcohol dependence (H)  Comment:    Plan: Quit 2020 - sober since    (J98.01,  B34.9) Acute bronchospasm due to viral infection  Comment:    Plan: Recent upper respiratory infection - improving.  Discussed follow up and quitting smoking    (F17.200) Tobacco dependence syndrome  Comment:    Plan: buPROPion (ZYBAN) 150 MG 12 hr tablet          The risks, benefits and treatment options of prescribed medications or other treatments have been discussed with the patient. The patient verbalized their understanding and should call or follow up if no improvement or if they develop further problems.      (I10) Benign essential hypertension  Comment:    Plan: Basic metabolic panel  (Ca, Cl, CO2, Creat,         Gluc, K, Na, BUN)          Controlled - not on medication. Lifestyle changes made    (M10.079) Idiopathic gout of ankle, unspecified chronicity, unspecified laterality  Comment:    Plan: indomethacin (INDOCIN) 50 MG capsule, Uric acid          Gout flares 1-2 x per year - mostly associated with diet. Discussed.  Labs today.  Refilled Indocin - works well for flares.    (Z13.6) CARDIOVASCULAR SCREENING; LDL GOAL LESS THAN 130  Comment:    Plan: Lipid panel reflex to direct LDL Non-fasting               Patient has been advised of split billing requirements and indicates understanding: Yes      COUNSELING:   Reviewed preventive health counseling, as reflected in  patient instructions       Regular exercise       Healthy diet/nutrition       Vision screening        He reports that he has been smoking. He has a 20.00 pack-year smoking history. He has never used smokeless tobacco.  Nicotine/Tobacco Cessation Plan:   Pharmacotherapies : bupropion (Zyban)         Chelle Rashid NP  [facility]

## 2023-03-09 ENCOUNTER — OFFICE VISIT (OUTPATIENT)
Dept: FAMILY MEDICINE | Facility: CLINIC | Age: 38
End: 2023-03-09
Payer: COMMERCIAL

## 2023-03-09 VITALS
BODY MASS INDEX: 24 KG/M2 | HEIGHT: 71 IN | HEART RATE: 60 BPM | RESPIRATION RATE: 20 BRPM | SYSTOLIC BLOOD PRESSURE: 120 MMHG | TEMPERATURE: 97 F | WEIGHT: 171.4 LBS | OXYGEN SATURATION: 97 % | DIASTOLIC BLOOD PRESSURE: 80 MMHG

## 2023-03-09 DIAGNOSIS — Z00.00 ROUTINE GENERAL MEDICAL EXAMINATION AT A HEALTH CARE FACILITY: Primary | ICD-10-CM

## 2023-03-09 DIAGNOSIS — F17.200 TOBACCO DEPENDENCE SYNDROME: ICD-10-CM

## 2023-03-09 DIAGNOSIS — I10 BENIGN ESSENTIAL HYPERTENSION: ICD-10-CM

## 2023-03-09 DIAGNOSIS — Z13.6 CARDIOVASCULAR SCREENING; LDL GOAL LESS THAN 130: ICD-10-CM

## 2023-03-09 DIAGNOSIS — M10.079 IDIOPATHIC GOUT OF ANKLE, UNSPECIFIED CHRONICITY, UNSPECIFIED LATERALITY: ICD-10-CM

## 2023-03-09 DIAGNOSIS — J98.01 ACUTE BRONCHOSPASM DUE TO VIRAL INFECTION: ICD-10-CM

## 2023-03-09 DIAGNOSIS — B34.9 ACUTE BRONCHOSPASM DUE TO VIRAL INFECTION: ICD-10-CM

## 2023-03-09 DIAGNOSIS — F10.21 HISTORY OF ALCOHOL DEPENDENCE (H): ICD-10-CM

## 2023-03-09 PROBLEM — F10.10 ALCOHOL ABUSE: Status: RESOLVED | Noted: 2018-11-27 | Resolved: 2023-03-09

## 2023-03-09 PROBLEM — K85.90 ACUTE PANCREATITIS WITHOUT INFECTION OR NECROSIS: Status: RESOLVED | Noted: 2018-11-27 | Resolved: 2023-03-09

## 2023-03-09 PROBLEM — K85.20 ALCOHOL-INDUCED ACUTE PANCREATITIS: Status: RESOLVED | Noted: 2018-11-27 | Resolved: 2023-03-09

## 2023-03-09 PROBLEM — K85.90 PANCREATITIS: Status: RESOLVED | Noted: 2018-11-27 | Resolved: 2023-03-09

## 2023-03-09 LAB
ANION GAP SERPL CALCULATED.3IONS-SCNC: 9 MMOL/L (ref 7–15)
BUN SERPL-MCNC: 12.7 MG/DL (ref 6–20)
CALCIUM SERPL-MCNC: 10.2 MG/DL (ref 8.6–10)
CHLORIDE SERPL-SCNC: 105 MMOL/L (ref 98–107)
CHOLEST SERPL-MCNC: 163 MG/DL
CREAT SERPL-MCNC: 0.96 MG/DL (ref 0.67–1.17)
DEPRECATED HCO3 PLAS-SCNC: 29 MMOL/L (ref 22–29)
GFR SERPL CREATININE-BSD FRML MDRD: >90 ML/MIN/1.73M2
GLUCOSE SERPL-MCNC: 92 MG/DL (ref 70–99)
HDLC SERPL-MCNC: 39 MG/DL
LDLC SERPL CALC-MCNC: 76 MG/DL
NONHDLC SERPL-MCNC: 124 MG/DL
POTASSIUM SERPL-SCNC: 4.5 MMOL/L (ref 3.4–5.3)
SODIUM SERPL-SCNC: 143 MMOL/L (ref 136–145)
TRIGL SERPL-MCNC: 240 MG/DL
URATE SERPL-MCNC: 7 MG/DL (ref 3.4–7)

## 2023-03-09 PROCEDURE — 90472 IMMUNIZATION ADMIN EACH ADD: CPT | Performed by: NURSE PRACTITIONER

## 2023-03-09 PROCEDURE — 90471 IMMUNIZATION ADMIN: CPT | Performed by: NURSE PRACTITIONER

## 2023-03-09 PROCEDURE — 84550 ASSAY OF BLOOD/URIC ACID: CPT | Performed by: NURSE PRACTITIONER

## 2023-03-09 PROCEDURE — 80048 BASIC METABOLIC PNL TOTAL CA: CPT | Performed by: NURSE PRACTITIONER

## 2023-03-09 PROCEDURE — 36415 COLL VENOUS BLD VENIPUNCTURE: CPT | Performed by: NURSE PRACTITIONER

## 2023-03-09 PROCEDURE — 99395 PREV VISIT EST AGE 18-39: CPT | Mod: 25 | Performed by: NURSE PRACTITIONER

## 2023-03-09 PROCEDURE — 90686 IIV4 VACC NO PRSV 0.5 ML IM: CPT | Performed by: NURSE PRACTITIONER

## 2023-03-09 PROCEDURE — 99214 OFFICE O/P EST MOD 30 MIN: CPT | Mod: 25 | Performed by: NURSE PRACTITIONER

## 2023-03-09 PROCEDURE — 80061 LIPID PANEL: CPT | Performed by: NURSE PRACTITIONER

## 2023-03-09 PROCEDURE — 90714 TD VACC NO PRESV 7 YRS+ IM: CPT | Performed by: NURSE PRACTITIONER

## 2023-03-09 PROCEDURE — 90677 PCV20 VACCINE IM: CPT | Performed by: NURSE PRACTITIONER

## 2023-03-09 RX ORDER — INDOMETHACIN 50 MG/1
50 CAPSULE ORAL 2 TIMES DAILY WITH MEALS
Qty: 10 CAPSULE | Refills: 3 | Status: SHIPPED | OUTPATIENT
Start: 2023-03-09 | End: 2024-06-29

## 2023-03-09 RX ORDER — BUPROPION HYDROCHLORIDE 150 MG/1
TABLET, FILM COATED, EXTENDED RELEASE ORAL
Qty: 63 TABLET | Refills: 4 | Status: SHIPPED | OUTPATIENT
Start: 2023-03-09 | End: 2023-04-11

## 2023-03-09 ASSESSMENT — ENCOUNTER SYMPTOMS
COUGH: 0
HEMATOCHEZIA: 0
NAUSEA: 0
DYSURIA: 0
PARESTHESIAS: 0
DIARRHEA: 0
FREQUENCY: 0
MYALGIAS: 0
JOINT SWELLING: 0
FEVER: 0
NERVOUS/ANXIOUS: 0
HEMATURIA: 0
SORE THROAT: 0
PALPITATIONS: 0
EYE PAIN: 0
CONSTIPATION: 0
HEADACHES: 0
ARTHRALGIAS: 0
WEAKNESS: 0
DIZZINESS: 0
HEARTBURN: 0
CHILLS: 0
SHORTNESS OF BREATH: 0
ABDOMINAL PAIN: 0

## 2023-03-09 ASSESSMENT — PAIN SCALES - GENERAL: PAINLEVEL: NO PAIN (0)

## 2023-03-09 NOTE — RESULT ENCOUNTER NOTE
All of your lab results are normal.    Your cholesterol is good considering you were not fasting. Suspecting triglycerides are elevated due to this.    Please notify patient of results.  KRIS Milian

## 2023-04-21 NOTE — ED NOTES
PT states he continues to be in pain and is denying nausea. No other changes in PT condition from previous assessments. PT is resting in position of comfort. All needs are being met and all comfort measures are being addressed. Awaiting MD dispo at this time. I will continue to assess and monitor PT.   Peng Advancement Flap Text: The defect edges were debeveled with a #15 scalpel blade.  Given the location of the defect, shape of the defect and the proximity to free margins a Peng advancement flap was deemed most appropriate.  Using a sterile surgical marker, an appropriate advancement flap was drawn incorporating the defect and placing the expected incisions within the relaxed skin tension lines where possible. The area thus outlined was incised deep to adipose tissue with a #15 scalpel blade.  The skin margins were undermined to an appropriate distance in all directions utilizing iris scissors.

## 2024-02-08 ENCOUNTER — PATIENT OUTREACH (OUTPATIENT)
Dept: CARE COORDINATION | Facility: CLINIC | Age: 39
End: 2024-02-08
Payer: COMMERCIAL

## 2024-02-22 ENCOUNTER — PATIENT OUTREACH (OUTPATIENT)
Dept: CARE COORDINATION | Facility: CLINIC | Age: 39
End: 2024-02-22
Payer: COMMERCIAL

## 2024-04-21 ENCOUNTER — HEALTH MAINTENANCE LETTER (OUTPATIENT)
Age: 39
End: 2024-04-21

## 2024-06-28 DIAGNOSIS — M10.079 IDIOPATHIC GOUT OF ANKLE, UNSPECIFIED CHRONICITY, UNSPECIFIED LATERALITY: ICD-10-CM

## 2024-06-28 NOTE — TELEPHONE ENCOUNTER
Patient calling regarding refill, he is hoping to have this approved today. He would a refill of Triamcinolone cream that was prescribed in 2022.         RAUL Foss

## 2024-06-29 ENCOUNTER — MYC REFILL (OUTPATIENT)
Dept: FAMILY MEDICINE | Facility: CLINIC | Age: 39
End: 2024-06-29
Payer: COMMERCIAL

## 2024-06-29 DIAGNOSIS — M10.079 IDIOPATHIC GOUT OF ANKLE, UNSPECIFIED CHRONICITY, UNSPECIFIED LATERALITY: ICD-10-CM

## 2024-06-29 NOTE — TELEPHONE ENCOUNTER
Medication Question or Refill    Contacts       Contact Date/Time Type Contact Phone/Fax    06/28/2024 11:29 AM CDT Interface (Incoming) [facility] [place] [address] [facility] [facility] [phone]    06/28/2024 11:50 AM CDT Phone (Incoming) Ed Mckenzie (Self) [phone]            What medication are you calling about (include dose and sig)?: indomethacin (INDOCIN) 50 MG capsule     Preferred Pharmacy:     [facility] [id number] [place] [facility] [place] [address] [address] [place] [address]  [address] [address] [facility] [address]  Phone: [phone] Fax: [phone]      Controlled Substance Agreement on file:   CSA -- Patient Level:    CSA: None found at the patient level.       Who prescribed the medication?: Chelle Rashid     Do you need a refill? Yes    When did you use the medication last? Pt uses it as needed, is completely out and is currently having a gout flare-up, please refill ASAP     Patient offered an appointment? No    Do you have any questions or concerns?  No      Could we send this information to you in [place] or would you prefer to receive a phone call?:   Patient would prefer a phone call   Okay to leave a detailed message?: Yes at Cell number on file:    Telephone Information:   Mobile [phone]

## 2024-07-01 RX ORDER — INDOMETHACIN 50 MG/1
CAPSULE ORAL
Qty: 10 CAPSULE | Refills: 3 | OUTPATIENT
Start: 2024-07-01

## 2024-07-01 RX ORDER — INDOMETHACIN 50 MG/1
50 CAPSULE ORAL 2 TIMES DAILY WITH MEALS
Qty: 10 CAPSULE | Refills: 0 | Status: SHIPPED | OUTPATIENT
Start: 2024-07-01

## 2024-07-01 NOTE — TELEPHONE ENCOUNTER
Requested Prescriptions   Pending Prescriptions Disp Refills    indomethacin (INDOCIN) 50 MG capsule [Pharmacy Med Name: INDOMETHACIN 50MG CAPS] 10 capsule 3     Sig: TAKE ONE CAPSULE BY MOUTH TWICE A DAY WITH MEALS       Gout Agents Protocol Failed - 6/29/2024  9:27 AM        Failed - CBC on file in past 12 months     Recent Labs   Lab Test 06/21/22  0942   WBC 11.0   RBC 5.17   HGB 15.2   HCT 44.8                    Failed - ALT on file in past 12 months     Recent Labs   Lab Test 06/21/22  0942   ALT 21             Failed - Has Uric Acid on file in past 12 months and value is less than 6     Recent Labs   Lab Test 03/09/23  1107   URIC 7.0     If level is 6mg/dL or greater, ok to refill one time and refer to provider.           Failed - Has GFR on file in past 12 months and most recent value is normal        Failed - Recent (12 mo) or future (90 days) visit within the authorizing provider's specialty     The patient must have completed an in-person or virtual visit within the past 12 months or has a future visit scheduled within the next 90 days with the authorizing provider s specialty.  Urgent care and e-visits do not quality as an office visit for this protocol.          Passed - Medication is active on med list        Passed - Medication indicated for associated diagnosis     One of the following medications: colchicine is prescribed for one or more of the following conditions:     Amyloidosis   ulcer of mouth   Bechet's syndrome   Pericarditis   Peyronie's disease, psoriasis          Passed - Patient is age 18 or older     Refill protocol is for patient's aged 18 years and older         NSAID Medications Failed - 6/29/2024  9:27 AM        Failed - Blood pressure under 140/90 in past 12 months     BP Readings from Last 3 Encounters:   03/09/23 120/80   07/13/22 (!) 136/98   06/21/22 132/78       No data recorded            Failed - Normal CBC on file in past 12 months     Recent Labs   Lab Test  06/21/22  0942   WBC 11.0   RBC 5.17   HGB 15.2   HCT 44.8                    Failed - Always Fail Criteria - Chart Review Required     Validate Diagnosis. If the medication is requested for an acute flare of a chronic pain associated with a musculoskeletal or rheumatologic condition; okay to authorize if all other criteria are met. If not, then forward to provider for review.          Failed - Normal GFR on file in past 12 months     Recent Labs   Lab Test 03/09/23  1107 06/21/22  0942 09/16/19  2217   GFRESTIMATED >90   < > >90   GFRESTBLACK  --   --  >90    < > = values in this interval not displayed.             Failed - Recent (12 mo) or future (90 days) visit within the authorizing provider's specialty     The patient must have completed an in-person or virtual visit within the past 12 months or has a future visit scheduled within the next 90 days with the authorizing provider s specialty.  Urgent care and e-visits do not quality as an office visit for this protocol.          Passed - Patient is age 6-64 years        Passed - Medication is active on med list

## 2024-10-04 ENCOUNTER — VIRTUAL VISIT (OUTPATIENT)
Dept: FAMILY MEDICINE | Facility: CLINIC | Age: 39
End: 2024-10-04
Payer: COMMERCIAL

## 2024-10-04 DIAGNOSIS — F17.200 NICOTINE DEPENDENCE, UNCOMPLICATED, UNSPECIFIED NICOTINE PRODUCT TYPE: ICD-10-CM

## 2024-10-04 DIAGNOSIS — Z87.891 PERSONAL HISTORY OF TOBACCO USE, PRESENTING HAZARDS TO HEALTH: Primary | ICD-10-CM

## 2024-10-04 PROCEDURE — 99441 PR PHYSICIAN TELEPHONE EVALUATION 5-10 MIN: CPT | Mod: 93 | Performed by: NURSE PRACTITIONER

## 2024-10-04 PROCEDURE — 99406 BEHAV CHNG SMOKING 3-10 MIN: CPT | Mod: 93 | Performed by: NURSE PRACTITIONER

## 2024-10-04 NOTE — PROGRESS NOTES
Ed is a 39 year old who is being evaluated via a billable telephone visit.    What phone number would you like to be contacted at? [phone]  How would you like to obtain your AVS? Teresa  Originating Location (pt. Location): Home    Distant Location (provider location):  On-site    Assessment & Plan     Personal history of tobacco use, presenting hazards to health  Would like options for quitting smoking.  He is aware to not combine all 3 at one time. Low dose patch given and then can use the oral nicotine replacements as needed.    Discussed SE and reasons to stop medication    - nicotine (NICORETTE) 4 MG lozenge  Dispense: 120 lozenge; Refill: 2  - nicotine (NICORETTE) 2 MG gum  Dispense: 120 each; Refill: 2  - nicotine (NICODERM CQ) 7 MG/24HR 24 hr patch  Dispense: 30 patch; Refill: 1    Spoke with patient regarding options for smoking cessation.  Discussed the use of : No interventions, Nicotine patch, Nicotine gum, Cold Turkey, and Quit Plan usage.   Patient chooses to go with the Nicotine patch and gum.  .            See Patient Instructions    Subjective   Ed is a 39 year old, presenting for the following health issues:  Smoking Cessation (Would like to get on something to help quit smoking )      10/4/2024     1:49 PM   Additional Questions   Roomed by nathan   Accompanied by self         10/4/2024     1:49 PM   Patient Reported Additional Medications   Patient reports taking the following new medications none     History of Present Illness       Reason for visit:  Quite smoking   He is taking medications regularly.       Reports smoking on average 1/2 pack per day for 15+ years.    Has tried quitting in the past and was most successful with quitting cold turkey.  Does not want medication for quitting.    Has not smoked in 1 week    Review of Systems  Constitutional, HEENT, cardiovascular, pulmonary, GI, , musculoskeletal, neuro, skin, endocrine and psych systems are negative, except as  otherwise noted.      Objective           Vitals:  No vitals were obtained today due to virtual visit.    Physical Exam   General: Alert and no distress //Respiratory: No audible wheeze, cough, or shortness of breath // Psychiatric:  Appropriate affect, tone, and pace of words            Phone call duration: 7 minutes  Signed Electronically by: Chelle Rashid DNP

## 2025-05-11 ENCOUNTER — HEALTH MAINTENANCE LETTER (OUTPATIENT)
Age: 40
End: 2025-05-11